# Patient Record
Sex: MALE | Race: WHITE | Employment: OTHER
[De-identification: names, ages, dates, MRNs, and addresses within clinical notes are randomized per-mention and may not be internally consistent; named-entity substitution may affect disease eponyms.]

---

## 2017-02-27 ENCOUNTER — OFFICE VISIT (OUTPATIENT)
Dept: INTERNAL MEDICINE | Facility: CLINIC | Age: 72
End: 2017-02-27

## 2017-02-27 VITALS
DIASTOLIC BLOOD PRESSURE: 74 MMHG | SYSTOLIC BLOOD PRESSURE: 136 MMHG | BODY MASS INDEX: 18.81 KG/M2 | WEIGHT: 127 LBS | HEIGHT: 69 IN

## 2017-02-27 DIAGNOSIS — J06.9 URI, ACUTE: Primary | ICD-10-CM

## 2017-02-27 PROCEDURE — 99212 OFFICE O/P EST SF 10 MIN: CPT | Performed by: INTERNAL MEDICINE

## 2017-02-27 RX ORDER — AZITHROMYCIN 250 MG/1
TABLET, FILM COATED ORAL
Qty: 1 PACKET | Refills: 0 | Status: SHIPPED | OUTPATIENT
Start: 2017-02-27 | End: 2017-07-31 | Stop reason: ALTCHOICE

## 2017-07-31 ENCOUNTER — OFFICE VISIT (OUTPATIENT)
Dept: INTERNAL MEDICINE CLINIC | Age: 72
End: 2017-07-31
Payer: MEDICARE

## 2017-07-31 VITALS
HEIGHT: 69 IN | HEART RATE: 77 BPM | WEIGHT: 120 LBS | OXYGEN SATURATION: 98 % | DIASTOLIC BLOOD PRESSURE: 72 MMHG | SYSTOLIC BLOOD PRESSURE: 128 MMHG | BODY MASS INDEX: 17.77 KG/M2 | TEMPERATURE: 98.1 F

## 2017-07-31 DIAGNOSIS — M67.431 GANGLION CYST OF WRIST, RIGHT: Primary | ICD-10-CM

## 2017-07-31 PROCEDURE — 99213 OFFICE O/P EST LOW 20 MIN: CPT | Performed by: NURSE PRACTITIONER

## 2017-07-31 ASSESSMENT — PATIENT HEALTH QUESTIONNAIRE - PHQ9
1. LITTLE INTEREST OR PLEASURE IN DOING THINGS: 0
SUM OF ALL RESPONSES TO PHQ QUESTIONS 1-9: 0
2. FEELING DOWN, DEPRESSED OR HOPELESS: 0
SUM OF ALL RESPONSES TO PHQ9 QUESTIONS 1 & 2: 0

## 2017-07-31 ASSESSMENT — ENCOUNTER SYMPTOMS: ROS SKIN COMMENTS: GROWTH

## 2017-09-11 ENCOUNTER — OFFICE VISIT (OUTPATIENT)
Dept: INTERNAL MEDICINE CLINIC | Age: 72
End: 2017-09-11
Payer: MEDICARE

## 2017-09-11 VITALS
HEIGHT: 69 IN | WEIGHT: 121 LBS | SYSTOLIC BLOOD PRESSURE: 110 MMHG | DIASTOLIC BLOOD PRESSURE: 70 MMHG | BODY MASS INDEX: 17.92 KG/M2

## 2017-09-11 DIAGNOSIS — M94.9 DISORDER OF CARTILAGE: ICD-10-CM

## 2017-09-11 DIAGNOSIS — M51.26 LUMBAR DISC HERNIATION: ICD-10-CM

## 2017-09-11 DIAGNOSIS — Z12.5 ENCOUNTER FOR SCREENING FOR MALIGNANT NEOPLASM OF PROSTATE: ICD-10-CM

## 2017-09-11 DIAGNOSIS — M25.531 RIGHT WRIST PAIN: ICD-10-CM

## 2017-09-11 DIAGNOSIS — E78.5 DYSLIPIDEMIA: Primary | ICD-10-CM

## 2017-09-11 DIAGNOSIS — R39.15 URINARY URGENCY: ICD-10-CM

## 2017-09-11 PROCEDURE — 99214 OFFICE O/P EST MOD 30 MIN: CPT | Performed by: INTERNAL MEDICINE

## 2017-09-11 ASSESSMENT — ENCOUNTER SYMPTOMS
APNEA: 0
TROUBLE SWALLOWING: 0
SHORTNESS OF BREATH: 0
CONSTIPATION: 0
CHOKING: 0
ANAL BLEEDING: 0
NAUSEA: 0
ABDOMINAL DISTENTION: 0
SINUS PRESSURE: 0
EYE ITCHING: 0
FACIAL SWELLING: 0
EYE DISCHARGE: 0
VOMITING: 0
BACK PAIN: 1
VOICE CHANGE: 0
COUGH: 0
RECTAL PAIN: 0
STRIDOR: 0
DIARRHEA: 0
SORE THROAT: 0
ABDOMINAL PAIN: 0
WHEEZING: 0
CHEST TIGHTNESS: 0
PHOTOPHOBIA: 0
RHINORRHEA: 0
BLOOD IN STOOL: 0

## 2017-11-02 ENCOUNTER — HOSPITAL ENCOUNTER (OUTPATIENT)
Age: 72
Setting detail: SPECIMEN
Discharge: HOME | End: 2017-11-02
Payer: MEDICARE

## 2017-11-02 DIAGNOSIS — Z12.5 ENCOUNTER FOR SCREENING FOR MALIGNANT NEOPLASM OF PROSTATE: ICD-10-CM

## 2017-11-02 DIAGNOSIS — M94.9 DISORDER OF CARTILAGE: ICD-10-CM

## 2017-11-02 DIAGNOSIS — R39.15 URINARY URGENCY: ICD-10-CM

## 2017-11-02 DIAGNOSIS — E78.5 DYSLIPIDEMIA: ICD-10-CM

## 2017-11-02 DIAGNOSIS — M51.26 LUMBAR DISC HERNIATION: ICD-10-CM

## 2017-11-02 LAB
ABSOLUTE EOS #: 0.12 K/UL (ref 0–0.44)
ABSOLUTE IMMATURE GRANULOCYTE: <0.03 K/UL (ref 0–0.3)
ABSOLUTE LYMPH #: 1.84 K/UL (ref 1.1–3.7)
ABSOLUTE MONO #: 0.55 K/UL (ref 0.1–1.2)
ALBUMIN SERPL-MCNC: 4.3 G/DL (ref 3.5–5.2)
ALBUMIN/GLOBULIN RATIO: 1.5 (ref 1–2.5)
ALP BLD-CCNC: 53 U/L (ref 40–129)
ALT SERPL-CCNC: 11 U/L (ref 5–41)
ANION GAP SERPL CALCULATED.3IONS-SCNC: 12 MMOL/L (ref 9–17)
AST SERPL-CCNC: 15 U/L
BASOPHILS # BLD: 1 % (ref 0–2)
BASOPHILS ABSOLUTE: 0.03 K/UL (ref 0–0.2)
BILIRUB SERPL-MCNC: 0.42 MG/DL (ref 0.3–1.2)
BILIRUBIN URINE: NEGATIVE
BUN BLDV-MCNC: 20 MG/DL (ref 8–23)
BUN/CREAT BLD: NORMAL (ref 9–20)
CALCIUM SERPL-MCNC: 9 MG/DL (ref 8.6–10.4)
CHLORIDE BLD-SCNC: 105 MMOL/L (ref 98–107)
CHOLESTEROL/HDL RATIO: 3.4
CHOLESTEROL: 196 MG/DL
CO2: 26 MMOL/L (ref 20–31)
COLOR: YELLOW
COMMENT UA: NORMAL
CREAT SERPL-MCNC: 0.9 MG/DL (ref 0.7–1.2)
DIFFERENTIAL TYPE: NORMAL
EOSINOPHILS RELATIVE PERCENT: 2 % (ref 1–4)
GFR AFRICAN AMERICAN: >60 ML/MIN
GFR NON-AFRICAN AMERICAN: >60 ML/MIN
GFR SERPL CREATININE-BSD FRML MDRD: NORMAL ML/MIN/{1.73_M2}
GFR SERPL CREATININE-BSD FRML MDRD: NORMAL ML/MIN/{1.73_M2}
GLUCOSE BLD-MCNC: 97 MG/DL (ref 70–99)
GLUCOSE URINE: NEGATIVE
HCT VFR BLD CALC: 43.1 % (ref 40.7–50.3)
HDLC SERPL-MCNC: 57 MG/DL
HEMOGLOBIN: 13.8 G/DL (ref 13–17)
IMMATURE GRANULOCYTES: 0 %
KETONES, URINE: NEGATIVE
LDL CHOLESTEROL: 121 MG/DL (ref 0–130)
LEUKOCYTE ESTERASE, URINE: NEGATIVE
LYMPHOCYTES # BLD: 30 % (ref 24–43)
MCH RBC QN AUTO: 29.7 PG (ref 25.2–33.5)
MCHC RBC AUTO-ENTMCNC: 32 G/DL (ref 29.9–34.7)
MCV RBC AUTO: 92.7 FL (ref 82.6–102.9)
MONOCYTES # BLD: 9 % (ref 3–12)
NITRITE, URINE: NEGATIVE
PDW BLD-RTO: 12.7 % (ref 11.8–14.4)
PH UA: 5 (ref 5–8)
PLATELET # BLD: 170 K/UL (ref 138–453)
PLATELET ESTIMATE: NORMAL
PMV BLD AUTO: 10 FL (ref 8.1–13.5)
POTASSIUM SERPL-SCNC: 4.4 MMOL/L (ref 3.7–5.3)
PROSTATE SPECIFIC ANTIGEN: 4.19 UG/L
PROTEIN UA: NEGATIVE
RBC # BLD: 4.65 M/UL (ref 4.21–5.77)
RBC # BLD: NORMAL 10*6/UL
SEG NEUTROPHILS: 58 % (ref 36–65)
SEGMENTED NEUTROPHILS ABSOLUTE COUNT: 3.56 K/UL (ref 1.5–8.1)
SODIUM BLD-SCNC: 143 MMOL/L (ref 135–144)
SPECIFIC GRAVITY UA: 1.02 (ref 1–1.03)
TOTAL PROTEIN: 7.2 G/DL (ref 6.4–8.3)
TRIGL SERPL-MCNC: 90 MG/DL
TSH SERPL DL<=0.05 MIU/L-ACNC: 2.6 MIU/L (ref 0.3–5)
TURBIDITY: CLEAR
URINE HGB: NEGATIVE
UROBILINOGEN, URINE: NORMAL
VITAMIN D 25-HYDROXY: 30 NG/ML (ref 30–100)
VLDLC SERPL CALC-MCNC: NORMAL MG/DL (ref 1–30)
WBC # BLD: 6.1 K/UL (ref 3.5–11.3)
WBC # BLD: NORMAL 10*3/UL

## 2020-01-21 ENCOUNTER — OFFICE VISIT (OUTPATIENT)
Dept: INTERNAL MEDICINE CLINIC | Age: 75
End: 2020-01-21
Payer: MEDICARE

## 2020-01-21 VITALS
HEIGHT: 69 IN | OXYGEN SATURATION: 98 % | SYSTOLIC BLOOD PRESSURE: 120 MMHG | BODY MASS INDEX: 18.96 KG/M2 | HEART RATE: 79 BPM | WEIGHT: 128 LBS | DIASTOLIC BLOOD PRESSURE: 76 MMHG

## 2020-01-21 PROBLEM — N40.1 BENIGN PROSTATIC HYPERPLASIA WITH URINARY FREQUENCY: Status: ACTIVE | Noted: 2020-01-21

## 2020-01-21 PROBLEM — R35.0 BENIGN PROSTATIC HYPERPLASIA WITH URINARY FREQUENCY: Status: ACTIVE | Noted: 2020-01-21

## 2020-01-21 PROCEDURE — G0008 ADMIN INFLUENZA VIRUS VAC: HCPCS | Performed by: INTERNAL MEDICINE

## 2020-01-21 PROCEDURE — 90653 IIV ADJUVANT VACCINE IM: CPT | Performed by: INTERNAL MEDICINE

## 2020-01-21 PROCEDURE — G0439 PPPS, SUBSEQ VISIT: HCPCS | Performed by: INTERNAL MEDICINE

## 2020-01-21 ASSESSMENT — ENCOUNTER SYMPTOMS
EYES NEGATIVE: 1
RESPIRATORY NEGATIVE: 1
BACK PAIN: 1
GASTROINTESTINAL NEGATIVE: 1

## 2020-01-21 ASSESSMENT — PATIENT HEALTH QUESTIONNAIRE - PHQ9
1. LITTLE INTEREST OR PLEASURE IN DOING THINGS: 0
SUM OF ALL RESPONSES TO PHQ QUESTIONS 1-9: 0
SUM OF ALL RESPONSES TO PHQ9 QUESTIONS 1 & 2: 0
2. FEELING DOWN, DEPRESSED OR HOPELESS: 0
SUM OF ALL RESPONSES TO PHQ QUESTIONS 1-9: 0

## 2020-01-21 NOTE — PROGRESS NOTES
Subjective:      Chief Complaint   Patient presents with   Shanelle Brunner Doctor     Pt here today as a new patient to Dr. Natty Delarosa        Patient ID: Rosi Brady is a 69 y.o. male. Visit Information    Have you changed or started any medications since your last visit including any over-the-counter medicines, vitamins, or herbal medicines? no   Are you having any side effects from any of your medications? -  no  Have you stopped taking any of your medications? Is so, why? -  no    Have you seen any other physician or provider since your last visit? No  Have you had any other diagnostic tests since your last visit? No  Have you been seen in the emergency room and/or had an admission to a hospital since we last saw you? No  Have you had your routine dental cleaning in the past 6 months? no    Have you activated your [employer] account? If not, what are your barriers? No:      Patient Care Team:  Leigh Oliveira MD as PCP - General    Medical History Review  Past Medical, Family, and Social History reviewed and does not contribute to the patient presenting condition    Health Maintenance   Topic Date Due    Hepatitis C screen  1945    DTaP/Tdap/Td vaccine (1 - Tdap) 09/24/1958    Shingles Vaccine (1 of 2) 09/24/1997    Pneumococcal 65+ years Vaccine (2 of 2 - PPSV23) 02/16/2017    Annual Wellness Visit (AWV)  06/23/2019    Flu vaccine (1) 09/01/2019    Colon cancer screen colonoscopy  10/24/2022    Lipid screen  11/02/2022       He has no symptoms . He has Hx of lumbar and cervical disc surgery. He had cervical myelopathy      Review of Systems   Constitutional: Negative. HENT: Negative. Eyes: Negative. Respiratory: Negative. Cardiovascular: Negative. Gastrointestinal: Negative. Endocrine: Negative. Genitourinary: Positive for frequency and urgency. Musculoskeletal: Positive for back pain and myalgias. Hx cervical and lumbar laminectomy   Skin: Negative. cranial nerve deficit. Sensory: No sensory deficit. Motor: No weakness. Coordination: Coordination normal.      Gait: Gait normal.      Deep Tendon Reflexes: Reflexes abnormal.   Psychiatric:         Mood and Affect: Mood normal.         Behavior: Behavior normal.         Thought Content: Thought content normal.         Judgment: Judgment normal.         Assessment / Plan:   Joycelyn Rodriguez was seen today for established new doctor. Diagnoses and all orders for this visit:    Need for influenza vaccination    Cervical spondylosis with myelopathy= no weakness but has limited ROM    Lumbar disc herniation= occasional back ache     Dyslipidemia= not treated ,he has minimal risks   Quadriplegia, C1-C4 incomplete (HCC)= recovered     Benign prostatic hyperplasia with urinary frequency= check PSA       No follow-ups on file. Orders Placed This Encounter   Procedures    INFLUENZA, TRIV, INACTIVATED, SUBUNIT, ADJUVANTED, 65 YRS AND OLDER, IM, PREFILL SYR, 0.5ML (FLUAD TRIV)    CBC Auto Differential     Standing Status:   Future     Standing Expiration Date:   1/20/2021    Comprehensive Metabolic Panel     Standing Status:   Future     Standing Expiration Date:   1/20/2021    Urinalysis with Microscopic     Standing Status:   Future     Standing Expiration Date:   1/20/2021     Order Specific Question:   SPECIFY(EX-CATH,MIDSTREAM,CYSTO,ETC)?      Answer:   clean catch    Sedimentation rate, automated     Standing Status:   Future     Standing Expiration Date:   1/20/2021    TSH without Reflex     Standing Status:   Future     Standing Expiration Date:   1/20/2021    Lipid Panel     Order Specific Question:   Is Patient Fasting?/# of Hours     Answer:   fasting    Psa screening     Standing Status:   Future     Standing Expiration Date:   1/20/2021    Lactate Dehydrogenase     Standing Status:   Future     Standing Expiration Date:   1/20/2021    Electrophoresis Protein, Serum without Reflex to Immunofixation     Standing Status:   Future     Standing Expiration Date:   1/20/2021    C-Reactive Protein     Standing Status:   Future     Standing Expiration Date:   1/20/2021    POCT Fecal Immunochemical Test (FIT)     Standing Status:   Future     Standing Expiration Date:   1/23/2020     No orders of the defined types were placed in this encounter.

## 2020-01-23 ENCOUNTER — HOSPITAL ENCOUNTER (OUTPATIENT)
Age: 75
Setting detail: SPECIMEN
Discharge: HOME | End: 2020-01-23
Payer: MEDICARE

## 2020-01-23 LAB
-: NORMAL
ABSOLUTE EOS #: 0.17 K/UL (ref 0–0.44)
ABSOLUTE IMMATURE GRANULOCYTE: <0.03 K/UL (ref 0–0.3)
ABSOLUTE LYMPH #: 1.58 K/UL (ref 1.1–3.7)
ABSOLUTE MONO #: 0.85 K/UL (ref 0.1–1.2)
ALBUMIN SERPL-MCNC: 4.2 G/DL (ref 3.5–5.2)
ALBUMIN/GLOBULIN RATIO: 1.4 (ref 1–2.5)
ALP BLD-CCNC: 56 U/L (ref 40–129)
ALT SERPL-CCNC: 12 U/L (ref 5–41)
AMORPHOUS: NORMAL
ANION GAP SERPL CALCULATED.3IONS-SCNC: 13 MMOL/L (ref 9–17)
AST SERPL-CCNC: 16 U/L
BACTERIA: NORMAL
BASOPHILS # BLD: 1 % (ref 0–2)
BASOPHILS ABSOLUTE: 0.04 K/UL (ref 0–0.2)
BILIRUB SERPL-MCNC: 0.47 MG/DL (ref 0.3–1.2)
BILIRUBIN URINE: NEGATIVE
BUN BLDV-MCNC: 25 MG/DL (ref 8–23)
BUN/CREAT BLD: ABNORMAL (ref 9–20)
C-REACTIVE PROTEIN: 2.6 MG/L (ref 0–5)
CALCIUM SERPL-MCNC: 9.6 MG/DL (ref 8.6–10.4)
CASTS UA: NORMAL /LPF (ref 0–8)
CHLORIDE BLD-SCNC: 102 MMOL/L (ref 98–107)
CHOLESTEROL, FASTING: 201 MG/DL
CHOLESTEROL/HDL RATIO: 4
CO2: 26 MMOL/L (ref 20–31)
COLOR: YELLOW
CREAT SERPL-MCNC: 0.94 MG/DL (ref 0.7–1.2)
CRYSTALS, UA: NORMAL /HPF
DIFFERENTIAL TYPE: ABNORMAL
EOSINOPHILS RELATIVE PERCENT: 3 % (ref 1–4)
EPITHELIAL CELLS UA: NORMAL /HPF (ref 0–5)
GFR AFRICAN AMERICAN: >60 ML/MIN
GFR NON-AFRICAN AMERICAN: >60 ML/MIN
GFR SERPL CREATININE-BSD FRML MDRD: ABNORMAL ML/MIN/{1.73_M2}
GFR SERPL CREATININE-BSD FRML MDRD: ABNORMAL ML/MIN/{1.73_M2}
GLUCOSE BLD-MCNC: 93 MG/DL (ref 70–99)
GLUCOSE URINE: NEGATIVE
HCT VFR BLD CALC: 44.9 % (ref 40.7–50.3)
HDLC SERPL-MCNC: 50 MG/DL
HEMOGLOBIN: 14.1 G/DL (ref 13–17)
IMMATURE GRANULOCYTES: 0 %
KETONES, URINE: NEGATIVE
LACTATE DEHYDROGENASE: 177 U/L (ref 135–225)
LDL CHOLESTEROL: 133 MG/DL (ref 0–130)
LEUKOCYTE ESTERASE, URINE: NEGATIVE
LYMPHOCYTES # BLD: 27 % (ref 24–43)
MCH RBC QN AUTO: 29.3 PG (ref 25.2–33.5)
MCHC RBC AUTO-ENTMCNC: 31.4 G/DL (ref 28.4–34.8)
MCV RBC AUTO: 93.3 FL (ref 82.6–102.9)
MONOCYTES # BLD: 15 % (ref 3–12)
MUCUS: NORMAL
NITRITE, URINE: NEGATIVE
NRBC AUTOMATED: 0 PER 100 WBC
OTHER OBSERVATIONS UA: NORMAL
PDW BLD-RTO: 12.7 % (ref 11.8–14.4)
PH UA: 5 (ref 5–8)
PLATELET # BLD: 170 K/UL (ref 138–453)
PLATELET ESTIMATE: ABNORMAL
PMV BLD AUTO: 10.1 FL (ref 8.1–13.5)
POTASSIUM SERPL-SCNC: 4.5 MMOL/L (ref 3.7–5.3)
PROSTATE SPECIFIC ANTIGEN: 2.02 UG/L
PROTEIN UA: NEGATIVE
RBC # BLD: 4.81 M/UL (ref 4.21–5.77)
RBC # BLD: ABNORMAL 10*6/UL
RBC UA: NORMAL /HPF (ref 0–4)
RENAL EPITHELIAL, UA: NORMAL /HPF
SEDIMENTATION RATE, ERYTHROCYTE: 12 MM (ref 0–10)
SEG NEUTROPHILS: 54 % (ref 36–65)
SEGMENTED NEUTROPHILS ABSOLUTE COUNT: 3.17 K/UL (ref 1.5–8.1)
SODIUM BLD-SCNC: 141 MMOL/L (ref 135–144)
SPECIFIC GRAVITY UA: 1.02 (ref 1–1.03)
TOTAL PROTEIN: 7.1 G/DL (ref 6.4–8.3)
TRICHOMONAS: NORMAL
TRIGLYCERIDE, FASTING: 91 MG/DL
TSH SERPL DL<=0.05 MIU/L-ACNC: 1.98 MIU/L (ref 0.3–5)
TURBIDITY: CLEAR
URINE HGB: NEGATIVE
UROBILINOGEN, URINE: NORMAL
VLDLC SERPL CALC-MCNC: ABNORMAL MG/DL (ref 1–30)
WBC # BLD: 5.8 K/UL (ref 3.5–11.3)
WBC # BLD: ABNORMAL 10*3/UL
WBC UA: NORMAL /HPF (ref 0–5)
YEAST: NORMAL

## 2020-01-27 LAB
ALBUMIN (CALCULATED): 4.7 G/DL (ref 3.2–5.2)
ALBUMIN PERCENT: 67 % (ref 45–65)
ALPHA 1 PERCENT: 2 % (ref 3–6)
ALPHA 2 PERCENT: 8 % (ref 6–13)
ALPHA-1-GLOBULIN: 0.2 G/DL (ref 0.1–0.4)
ALPHA-2-GLOBULIN: 0.6 G/DL (ref 0.5–0.9)
BETA GLOBULIN: 0.8 G/DL (ref 0.5–1.1)
BETA PERCENT: 11 % (ref 11–19)
GAMMA GLOBULIN %: 12 % (ref 9–20)
GAMMA GLOBULIN: 0.8 G/DL (ref 0.5–1.5)
PATHOLOGIST: ABNORMAL
PROTEIN ELECTROPHORESIS, SERUM: ABNORMAL
TOTAL PROT. SUM,%: 100 % (ref 98–102)
TOTAL PROT. SUM: 7.1 G/DL (ref 6.3–8.2)
TOTAL PROTEIN: 7.1 G/DL (ref 6.4–8.3)

## 2021-03-10 ENCOUNTER — OFFICE VISIT (OUTPATIENT)
Dept: INTERNAL MEDICINE CLINIC | Age: 76
End: 2021-03-10
Payer: MEDICARE

## 2021-03-10 VITALS
BODY MASS INDEX: 18.81 KG/M2 | DIASTOLIC BLOOD PRESSURE: 74 MMHG | HEART RATE: 74 BPM | TEMPERATURE: 99.2 F | WEIGHT: 127 LBS | SYSTOLIC BLOOD PRESSURE: 122 MMHG | HEIGHT: 69 IN | OXYGEN SATURATION: 97 %

## 2021-03-10 DIAGNOSIS — Z23 ENCOUNTER FOR IMMUNIZATION: ICD-10-CM

## 2021-03-10 DIAGNOSIS — Z12.5 ENCOUNTER FOR SCREENING FOR MALIGNANT NEOPLASM OF PROSTATE: ICD-10-CM

## 2021-03-10 DIAGNOSIS — G82.52 QUADRIPLEGIA, C1-C4 INCOMPLETE (HCC): Primary | ICD-10-CM

## 2021-03-10 DIAGNOSIS — M47.12 CERVICAL SPONDYLOSIS WITH MYELOPATHY: Chronic | ICD-10-CM

## 2021-03-10 DIAGNOSIS — Z00.00 INITIAL MEDICARE ANNUAL WELLNESS VISIT: ICD-10-CM

## 2021-03-10 DIAGNOSIS — M51.26 LUMBAR DISC HERNIATION: ICD-10-CM

## 2021-03-10 PROCEDURE — 4040F PNEUMOC VAC/ADMIN/RCVD: CPT | Performed by: INTERNAL MEDICINE

## 2021-03-10 PROCEDURE — G0402 INITIAL PREVENTIVE EXAM: HCPCS | Performed by: INTERNAL MEDICINE

## 2021-03-10 PROCEDURE — 1123F ACP DISCUSS/DSCN MKR DOCD: CPT | Performed by: INTERNAL MEDICINE

## 2021-03-10 PROCEDURE — 3017F COLORECTAL CA SCREEN DOC REV: CPT | Performed by: INTERNAL MEDICINE

## 2021-03-10 ASSESSMENT — ENCOUNTER SYMPTOMS
RESPIRATORY NEGATIVE: 1
EYES NEGATIVE: 1
GASTROINTESTINAL NEGATIVE: 1
BACK PAIN: 0

## 2021-03-10 ASSESSMENT — PATIENT HEALTH QUESTIONNAIRE - PHQ9
1. LITTLE INTEREST OR PLEASURE IN DOING THINGS: 0
SUM OF ALL RESPONSES TO PHQ9 QUESTIONS 1 & 2: 0
SUM OF ALL RESPONSES TO PHQ QUESTIONS 1-9: 0

## 2021-03-10 NOTE — PROGRESS NOTES
Subjective:      Patient ID: Mirta Park is a 70 y.o. male. He is here for Medicare wellness visit and is asymptomatic      Review of Systems   Constitutional: Negative. HENT: Negative. Eyes: Negative. Respiratory: Negative. Cardiovascular: Negative. Gastrointestinal: Negative. Endocrine: Negative. Genitourinary: Negative for frequency and urgency. Musculoskeletal: Negative for back pain and myalgias. Hx cervical and lumbar laminectomy   Skin: Negative. Neurological: Negative. Hematological: Negative. Psychiatric/Behavioral: Negative. Objective:   Physical Exam  Constitutional:       Appearance: Normal appearance. Comments: Looks younger than stated age    HENT:      Head: Normocephalic and atraumatic. Nose: Nose normal. No congestion or rhinorrhea. Mouth/Throat:      Mouth: Mucous membranes are moist.      Pharynx: No oropharyngeal exudate or posterior oropharyngeal erythema. Eyes:      General:         Right eye: No discharge. Left eye: No discharge. Extraocular Movements: Extraocular movements intact. Pupils: Pupils are equal, round, and reactive to light. Neck:      Musculoskeletal: Neck rigidity present. No muscular tenderness. Vascular: No carotid bruit. Cardiovascular:      Rate and Rhythm: Normal rate and regular rhythm. Pulses: Normal pulses. Heart sounds: No murmur. Pulmonary:      Effort: No respiratory distress. Breath sounds: No stridor. No wheezing, rhonchi or rales. Chest:      Chest wall: No tenderness. Abdominal:      General: Abdomen is flat. Palpations: Abdomen is soft. There is no mass. Tenderness: There is no abdominal tenderness. Hernia: No hernia is present. There is no hernia in the left inguinal area or right inguinal area. Genitourinary:     Penis: Normal and circumcised.  No phimosis, paraphimosis, hypospadias, erythema, tenderness, discharge, swelling or lesions. Testes: Normal.      Epididymis:      Right: Normal.      Left: Normal.      Rectum: Normal.      Comments: Repeat her digital rectal exam shows that he has large prostate but normal in consistency. The size is 3+  Musculoskeletal:         General: No swelling, tenderness, deformity or signs of injury. Right lower leg: No edema. Left lower leg: No edema. Lymphadenopathy:      Cervical: No cervical adenopathy. Lower Body: No right inguinal adenopathy. No left inguinal adenopathy. Skin:     General: Skin is warm and dry. Capillary Refill: Capillary refill takes less than 2 seconds. Coloration: Skin is not jaundiced or pale. Findings: No bruising, erythema, lesion or rash. Neurological:      General: No focal deficit present. Mental Status: He is alert and oriented to person, place, and time. Cranial Nerves: No cranial nerve deficit. Sensory: No sensory deficit. Motor: No weakness. Coordination: Coordination normal.      Gait: Gait normal.      Deep Tendon Reflexes: Reflexes abnormal.   Psychiatric:         Mood and Affect: Mood normal.         Behavior: Behavior normal.         Thought Content: Thought content normal.         Judgment: Judgment normal.         Assessment / Plan:      Diagnosis Orders   1. Quadriplegia, C1-C4 incomplete ([facility] [address]     2. Cervical spondylosis with myelopathy     3. Lumbar disc herniation     4. Initial Medicare annual wellness visit  CBC Auto Differential    Comprehensive Metabolic Panel    Lipid Panel    Urinalysis with Microscopic    Sedimentation rate, automated    TSH without Reflex    C-Reactive Protein    PSA screening    Lactate Dehydrogenase    Hepatitis C Antibody    PREVNAR 13 IM (Pneumococcal conjugate vaccine 13-valent)    Tetanus-Diphth-Acell Pertussis (BOOSTRIX) 5-2.5-18.5 LF-MCG/0.5 injection    Lead, Blood    Cologuard (For External Results Only)   5.  Encounter for screening for malignant neoplasm of prostate   PSA screening   6. Encounter for immunization   PREVNAR 13 IM (Pneumococcal conjugate vaccine 13-valent)      Diagnosis Orders   1. Quadriplegia, C1-C4 incomplete ([address]     2. Cervical spondylosis with myelopathy     3. Lumbar disc herniation     4. Initial Medicare annual wellness visit =    His wellness visit he was given Prevnar, tetanus booster and he was advised that he can go to the pharmacy to get herpes zoster vaccine. I will check Cologuard and also complete laboratory work-up including hep C screening. Please normal and he has no gait problems         Return in about 6 months (around 9/10/2021) for Follow Up. Orders Placed This Encounter   Procedures    Cologuard (For External Results Only)     This test is performed by an external laboratory and is used for result attachment only. It is required that this order requisition be faxed to: [facility] @ [phone]. See [email or web address] for further information. Standing Status:   Future     Standing Expiration Date:   3/10/2022    PREVNAR 13 IM (Pneumococcal conjugate vaccine 13-valent)    CBC Auto Differential     Standing Status:   Future     Standing Expiration Date:   3/10/2022    Comprehensive Metabolic Panel     Standing Status:   Future     Standing Expiration Date:   3/10/2022    Lipid Panel     Order Specific Question:   Is Patient Fasting?/# of Hours     Answer:   fasting    Urinalysis with Microscopic     Standing Status:   Future     Standing Expiration Date:   3/10/2022     Order Specific Question:   SPECIFY(EX-CATH,MIDSTREAM,CYSTO,ETC)?      Answer:   clean catch    Sedimentation rate, automated     Standing Status:   Future     Standing Expiration Date:   3/10/2022    TSH without Reflex     Standing Status:   Future     Standing Expiration Date:   3/10/2022    C-Reactive Protein     Standing Status:   Future     Standing Expiration Date:   3/10/2022    PSA screening     Standing Status:   Future Standing Expiration Date:   3/10/2022    Lactate Dehydrogenase     Standing Status:   Future     Standing Expiration Date:   3/10/2022    Hepatitis C Antibody     Standing Status:   Future     Standing Expiration Date:   3/10/2022    Lead, Blood     Standing Status:   Future     Standing Expiration Date:   3/10/2022     Orders Placed This Encounter   Medications    Tetanus-Diphth-Acell Pertussis ([address]) 5-2.5-18.5 LF-MCG/0.5 injection     Sig: Inject 0.5 mLs into the muscle once for 1 dose     Dispense:  1 each     Refill:  0        Visit Information    Have you changed or started any medications since your last visit including any over-the-counter medicines, vitamins, or herbal medicines? no   Are you having any side effects from any of your medications? -  no  Have you stopped taking any of your medications? Is so, why? -  no    Have you seen any other physician or provider since your last visit? No  Have you had any other diagnostic tests since your last visit? No  Have you been seen in the emergency room and/or had an admission to a hospital since we last saw you? No  Have you had your routine dental cleaning in the past 6 months? no    Have you activated your [employer] account? If not, what are your barriers?  No:      Patient Care Team:  Savanna Barnard MD as PCP - General (Internal Medicine)  Savanna Barnard MD as PCP - [facility]    Medical History Review  Past Medical, Family, and Social History reviewed and does not contribute to the patient presenting condition    Health Maintenance   Topic Date Due    Hepatitis C screen  Never done    COVID-19 Vaccine (1 of 2) Never done    DTaP/Tdap/Td vaccine (1 - Tdap) Never done    Shingles Vaccine (1 of 2) Never done    Pneumococcal 65+ years Vaccine (2 of 2 - PPSV23) 02/16/2017    Annual Wellness Visit (AWV)  Never done    Flu vaccine (1) 09/01/2020    Colon cancer screen colonoscopy  10/24/2022    Lipid screen  01/23/2025    Hepatitis A vaccine  Aged Out    Hepatitis B vaccine  Aged Out    Hib vaccine  Aged Out    Meningococcal (ACWY) vaccine  Aged Out

## 2021-03-12 DIAGNOSIS — Z12.11 SCREENING FOR MALIGNANT NEOPLASM OF COLON: Primary | ICD-10-CM

## 2021-03-15 ENCOUNTER — NURSE ONLY (OUTPATIENT)
Dept: INTERNAL MEDICINE CLINIC | Age: 76
End: 2021-03-15
Payer: MEDICARE

## 2021-03-15 ENCOUNTER — HOSPITAL ENCOUNTER (OUTPATIENT)
Age: 76
Setting detail: SPECIMEN
Discharge: HOME | End: 2021-03-15
Payer: MEDICARE

## 2021-03-15 DIAGNOSIS — Z00.00 INITIAL MEDICARE ANNUAL WELLNESS VISIT: ICD-10-CM

## 2021-03-15 DIAGNOSIS — Z12.5 ENCOUNTER FOR SCREENING FOR MALIGNANT NEOPLASM OF PROSTATE: ICD-10-CM

## 2021-03-15 DIAGNOSIS — Z23 NEED FOR PROPHYLACTIC VACCINATION AGAINST STREPTOCOCCUS PNEUMONIAE (PNEUMOCOCCUS): Primary | ICD-10-CM

## 2021-03-15 DIAGNOSIS — Z23 NEED FOR TETANUS BOOSTER: ICD-10-CM

## 2021-03-15 LAB
-: NORMAL
ABSOLUTE EOS #: 0.12 K/UL (ref 0–0.44)
ABSOLUTE IMMATURE GRANULOCYTE: <0.03 K/UL (ref 0–0.3)
ABSOLUTE LYMPH #: 1.52 K/UL (ref 1.1–3.7)
ABSOLUTE MONO #: 0.64 K/UL (ref 0.1–1.2)
ALBUMIN SERPL-MCNC: 4.7 G/DL (ref 3.5–5.2)
ALBUMIN/GLOBULIN RATIO: 1.6 (ref 1–2.5)
ALP BLD-CCNC: 59 U/L (ref 40–129)
ALT SERPL-CCNC: 13 U/L (ref 5–41)
AMORPHOUS: NORMAL
ANION GAP SERPL CALCULATED.3IONS-SCNC: 14 MMOL/L (ref 9–17)
AST SERPL-CCNC: 18 U/L
BACTERIA: NORMAL
BASOPHILS # BLD: 1 % (ref 0–2)
BASOPHILS ABSOLUTE: 0.05 K/UL (ref 0–0.2)
BILIRUB SERPL-MCNC: 0.55 MG/DL (ref 0.3–1.2)
BILIRUBIN URINE: NEGATIVE
BUN BLDV-MCNC: 22 MG/DL (ref 8–23)
BUN/CREAT BLD: NORMAL (ref 9–20)
C-REACTIVE PROTEIN: <3 MG/L (ref 0–5)
CALCIUM SERPL-MCNC: 9.7 MG/DL (ref 8.6–10.4)
CASTS UA: NORMAL /LPF (ref 0–8)
CHLORIDE BLD-SCNC: 103 MMOL/L (ref 98–107)
CHOLESTEROL, FASTING: 218 MG/DL
CHOLESTEROL/HDL RATIO: 3.8
CO2: 25 MMOL/L (ref 20–31)
COLOR: YELLOW
CREAT SERPL-MCNC: 0.97 MG/DL (ref 0.7–1.2)
CRYSTALS, UA: NORMAL /HPF
DIFFERENTIAL TYPE: NORMAL
EOSINOPHILS RELATIVE PERCENT: 2 % (ref 1–4)
EPITHELIAL CELLS UA: NORMAL /HPF (ref 0–5)
GFR AFRICAN AMERICAN: >60 ML/MIN
GFR NON-AFRICAN AMERICAN: >60 ML/MIN
GFR SERPL CREATININE-BSD FRML MDRD: NORMAL ML/MIN/{1.73_M2}
GFR SERPL CREATININE-BSD FRML MDRD: NORMAL ML/MIN/{1.73_M2}
GLUCOSE BLD-MCNC: 95 MG/DL (ref 70–99)
GLUCOSE URINE: NEGATIVE
HCT VFR BLD CALC: 43.6 % (ref 40.7–50.3)
HDLC SERPL-MCNC: 57 MG/DL
HEMOGLOBIN: 14 G/DL (ref 13–17)
HEPATITIS C ANTIBODY: NONREACTIVE
IMMATURE GRANULOCYTES: 0 %
KETONES, URINE: NEGATIVE
LACTATE DEHYDROGENASE: 173 U/L (ref 135–225)
LDL CHOLESTEROL: 146 MG/DL (ref 0–130)
LEUKOCYTE ESTERASE, URINE: NEGATIVE
LYMPHOCYTES # BLD: 25 % (ref 24–43)
MCH RBC QN AUTO: 29.8 PG (ref 25.2–33.5)
MCHC RBC AUTO-ENTMCNC: 32.1 G/DL (ref 28.4–34.8)
MCV RBC AUTO: 92.8 FL (ref 82.6–102.9)
MONOCYTES # BLD: 11 % (ref 3–12)
MUCUS: NORMAL
NITRITE, URINE: NEGATIVE
NRBC AUTOMATED: 0 PER 100 WBC
OTHER OBSERVATIONS UA: NORMAL
PDW BLD-RTO: 12.9 % (ref 11.8–14.4)
PH UA: 5 (ref 5–8)
PLATELET # BLD: 179 K/UL (ref 138–453)
PLATELET ESTIMATE: NORMAL
PMV BLD AUTO: 9.7 FL (ref 8.1–13.5)
POTASSIUM SERPL-SCNC: 4.3 MMOL/L (ref 3.7–5.3)
PROSTATE SPECIFIC ANTIGEN: 2.16 UG/L
PROTEIN UA: NEGATIVE
RBC # BLD: 4.7 M/UL (ref 4.21–5.77)
RBC # BLD: NORMAL 10*6/UL
RBC UA: NORMAL /HPF (ref 0–4)
RENAL EPITHELIAL, UA: NORMAL /HPF
SEDIMENTATION RATE, ERYTHROCYTE: 7 MM (ref 0–20)
SEG NEUTROPHILS: 61 % (ref 36–65)
SEGMENTED NEUTROPHILS ABSOLUTE COUNT: 3.76 K/UL (ref 1.5–8.1)
SODIUM BLD-SCNC: 142 MMOL/L (ref 135–144)
SPECIFIC GRAVITY UA: 1.02 (ref 1–1.03)
TOTAL PROTEIN: 7.6 G/DL (ref 6.4–8.3)
TRICHOMONAS: NORMAL
TRIGLYCERIDE, FASTING: 73 MG/DL
TSH SERPL DL<=0.05 MIU/L-ACNC: 2.09 MIU/L (ref 0.3–5)
TURBIDITY: CLEAR
URINE HGB: NEGATIVE
UROBILINOGEN, URINE: NORMAL
VLDLC SERPL CALC-MCNC: ABNORMAL MG/DL (ref 1–30)
WBC # BLD: 6.1 K/UL (ref 3.5–11.3)
WBC # BLD: NORMAL 10*3/UL
WBC UA: NORMAL /HPF (ref 0–5)
YEAST: NORMAL

## 2021-03-15 PROCEDURE — 90732 PPSV23 VACC 2 YRS+ SUBQ/IM: CPT | Performed by: INTERNAL MEDICINE

## 2021-03-15 PROCEDURE — G0009 ADMIN PNEUMOCOCCAL VACCINE: HCPCS | Performed by: INTERNAL MEDICINE

## 2021-03-16 LAB — LEAD BLOOD: 4 UG/DL (ref 0–4)

## 2021-03-30 ENCOUNTER — TELEPHONE (OUTPATIENT)
Dept: INTERNAL MEDICINE CLINIC | Age: 76
End: 2021-03-30

## 2021-03-31 DIAGNOSIS — Z12.11 SCREENING FOR MALIGNANT NEOPLASM OF COLON: ICD-10-CM

## 2021-04-09 PROBLEM — Z00.00 INITIAL MEDICARE ANNUAL WELLNESS VISIT: Status: RESOLVED | Noted: 2021-03-10 | Resolved: 2021-04-09

## 2021-12-08 ENCOUNTER — OFFICE VISIT (OUTPATIENT)
Dept: INTERNAL MEDICINE CLINIC | Age: 76
End: 2021-12-08
Payer: MEDICARE

## 2021-12-08 VITALS
OXYGEN SATURATION: 99 % | BODY MASS INDEX: 18.36 KG/M2 | HEART RATE: 76 BPM | SYSTOLIC BLOOD PRESSURE: 136 MMHG | WEIGHT: 124 LBS | DIASTOLIC BLOOD PRESSURE: 78 MMHG

## 2021-12-08 DIAGNOSIS — N40.1 BENIGN PROSTATIC HYPERPLASIA WITH URINARY FREQUENCY: ICD-10-CM

## 2021-12-08 DIAGNOSIS — M47.12 CERVICAL SPONDYLOSIS WITH MYELOPATHY: Primary | Chronic | ICD-10-CM

## 2021-12-08 DIAGNOSIS — M51.26 LUMBAR DISC HERNIATION: ICD-10-CM

## 2021-12-08 DIAGNOSIS — R35.0 BENIGN PROSTATIC HYPERPLASIA WITH URINARY FREQUENCY: ICD-10-CM

## 2021-12-08 DIAGNOSIS — G82.52 QUADRIPLEGIA, C1-C4 INCOMPLETE (HCC): Chronic | ICD-10-CM

## 2021-12-08 PROCEDURE — 99214 OFFICE O/P EST MOD 30 MIN: CPT | Performed by: INTERNAL MEDICINE

## 2021-12-08 RX ORDER — IBUPROFEN 400 MG/1
400 TABLET ORAL EVERY 6 HOURS PRN
COMMUNITY

## 2021-12-08 SDOH — ECONOMIC STABILITY: FOOD INSECURITY: WITHIN THE PAST 12 MONTHS, YOU WORRIED THAT YOUR FOOD WOULD RUN OUT BEFORE YOU GOT MONEY TO BUY MORE.: NEVER TRUE

## 2021-12-08 SDOH — ECONOMIC STABILITY: TRANSPORTATION INSECURITY
IN THE PAST 12 MONTHS, HAS THE LACK OF TRANSPORTATION KEPT YOU FROM MEDICAL APPOINTMENTS OR FROM GETTING MEDICATIONS?: NO

## 2021-12-08 SDOH — ECONOMIC STABILITY: FOOD INSECURITY: WITHIN THE PAST 12 MONTHS, THE FOOD YOU BOUGHT JUST DIDN'T LAST AND YOU DIDN'T HAVE MONEY TO GET MORE.: NEVER TRUE

## 2021-12-08 SDOH — ECONOMIC STABILITY: TRANSPORTATION INSECURITY
IN THE PAST 12 MONTHS, HAS LACK OF TRANSPORTATION KEPT YOU FROM MEETINGS, WORK, OR FROM GETTING THINGS NEEDED FOR DAILY LIVING?: NO

## 2021-12-08 ASSESSMENT — LIFESTYLE VARIABLES: HOW OFTEN DO YOU HAVE A DRINK CONTAINING ALCOHOL: NEVER

## 2021-12-08 ASSESSMENT — SOCIAL DETERMINANTS OF HEALTH (SDOH): HOW HARD IS IT FOR YOU TO PAY FOR THE VERY BASICS LIKE FOOD, HOUSING, MEDICAL CARE, AND HEATING?: NOT HARD AT ALL

## 2021-12-08 NOTE — PROGRESS NOTES
Subjective:      Patient ID: Valerie Timmons is a 78 y.o. male. The patient has a previous history of cervical disc disease and has had hardware in his neck. He stated that he has been raking and has developed significant neck pain. He denied any motor weakness in his upper or lower extremity and has no cranial nerve symptoms      Review of Systems   Constitutional: Negative. HENT: Negative. Eyes: Negative. Respiratory: Negative. Cardiovascular: Negative. Gastrointestinal: Negative. Endocrine: Negative. Genitourinary: Negative for frequency and urgency. Musculoskeletal: Negative for back pain and myalgias. Hx cervical and lumbar laminectomy   Skin: Negative. Neurological: Negative. Hematological: Negative. Psychiatric/Behavioral: Negative. Objective:   Physical Exam  Constitutional:       Appearance: Normal appearance. Comments: Looks younger than stated age    HENT:      Head: Normocephalic and atraumatic. Nose: Nose normal. No congestion or rhinorrhea. Mouth/Throat:      Mouth: Mucous membranes are moist.      Pharynx: No oropharyngeal exudate or posterior oropharyngeal erythema. Eyes:      General:         Right eye: No discharge. Left eye: No discharge. Extraocular Movements: Extraocular movements intact. Pupils: Pupils are equal, round, and reactive to light. Neck:      Vascular: No carotid bruit. Cardiovascular:      Rate and Rhythm: Normal rate and regular rhythm. Pulses: Normal pulses. Heart sounds: No murmur heard. Pulmonary:      Effort: No respiratory distress. Breath sounds: No stridor. No wheezing, rhonchi or rales. Chest:      Chest wall: No tenderness. Abdominal:      General: Abdomen is flat. Palpations: Abdomen is soft. There is no mass. Tenderness: There is no abdominal tenderness. Hernia: No hernia is present.  There is no hernia in the left inguinal area or right inguinal area. Genitourinary:     Penis: Normal and circumcised. No phimosis, paraphimosis, hypospadias, erythema, tenderness, discharge, swelling or lesions. Testes: Normal.      Epididymis:      Right: Normal.      Left: Normal.      Rectum: Normal.   Musculoskeletal:         General: No swelling, tenderness, deformity or signs of injury. Cervical back: Rigidity present. No muscular tenderness. Right lower leg: No edema. Left lower leg: No edema. Lymphadenopathy:      Cervical: No cervical adenopathy. Lower Body: No right inguinal adenopathy. No left inguinal adenopathy. Skin:     General: Skin is warm and dry. Capillary Refill: Capillary refill takes less than 2 seconds. Coloration: Skin is not jaundiced or pale. Findings: No bruising, erythema, lesion or rash. Neurological:      General: No focal deficit present. Mental Status: He is alert and oriented to person, place, and time. Cranial Nerves: No cranial nerve deficit. Sensory: No sensory deficit. Motor: No weakness. Coordination: Coordination normal.      Gait: Gait normal.      Deep Tendon Reflexes: Reflexes abnormal.   Psychiatric:         Mood and Affect: Mood normal.         Behavior: Behavior normal.         Thought Content: Thought content normal.         Judgment: Judgment normal.         Assessment / Plan:      Diagnosis Orders   1. Cervical spondylosis with myelopathy = on my skin examination it was found that he has a normal power in both upper and lower extremities and has normal pinprick sensation. I reassured patient that on my clinical examination he has no significant changes in his cervical spine, will be a candidate for MRI neck if he continues to have this pain    2. Quadriplegia, C1-C4 incomplete ([address]     3. Lumbar disc herniation     4. Benign prostatic hyperplasia with urinary frequency        Return in about 2 weeks (around 12/22/2021) for Follow Up.   No orders of the defined types were placed in this encounter. No orders of the defined types were placed in this encounter. Visit Information    Have you changed or started any medications since your last visit including any over-the-counter medicines, vitamins, or herbal medicines? no   Are you having any side effects from any of your medications? -  no  Have you stopped taking any of your medications? Is so, why? -  no    Have you seen any other physician or provider since your last visit? No  Have you had any other diagnostic tests since your last visit? No  Have you been seen in the emergency room and/or had an admission to a hospital since we last saw you? No  Have you had your routine dental cleaning in the past 6 months? yes -     Have you activated your [employer] account?  If not, what are your barriers?no    Patient Care Team:  Carmen Harper MD as PCP - General (Internal Medicine)  Carmen Harper MD as PCP - [facility]    Medical History Review  Past Medical, Family, and Social History reviewed and does not contribute to the patient presenting condition    Health Maintenance   Topic Date Due    COVID-19 Vaccine (1) Never done    Shingles Vaccine (1 of 2) Never done    Flu vaccine (1) 09/01/2021    Annual Wellness Visit (AWV)  03/11/2022    Colon cancer screen colonoscopy  10/24/2022    Lipid screen  03/15/2026    DTaP/Tdap/Td vaccine (2 - Td or Tdap) 03/15/2031    Pneumococcal 65+ years Vaccine  Completed    Hepatitis C screen  Completed    Hepatitis A vaccine  Aged Out    Hepatitis B vaccine  Aged Out    Hib vaccine  Aged Out    Meningococcal (ACWY) vaccine  Aged Out

## 2021-12-09 ASSESSMENT — ENCOUNTER SYMPTOMS
BACK PAIN: 0
EYES NEGATIVE: 1
GASTROINTESTINAL NEGATIVE: 1
RESPIRATORY NEGATIVE: 1

## 2022-06-02 ENCOUNTER — OFFICE VISIT (OUTPATIENT)
Dept: INTERNAL MEDICINE CLINIC | Age: 77
End: 2022-06-02
Payer: MEDICARE

## 2022-06-02 VITALS
OXYGEN SATURATION: 99 % | HEIGHT: 68 IN | BODY MASS INDEX: 17.98 KG/M2 | HEART RATE: 78 BPM | DIASTOLIC BLOOD PRESSURE: 82 MMHG | WEIGHT: 118.6 LBS | SYSTOLIC BLOOD PRESSURE: 128 MMHG

## 2022-06-02 DIAGNOSIS — Z12.5 PROSTATE CANCER SCREENING: ICD-10-CM

## 2022-06-02 DIAGNOSIS — R35.1 NOCTURIA: Primary | ICD-10-CM

## 2022-06-02 DIAGNOSIS — Z13.220 LIPID SCREENING: ICD-10-CM

## 2022-06-02 DIAGNOSIS — R53.83 FATIGUE, UNSPECIFIED TYPE: ICD-10-CM

## 2022-06-02 PROCEDURE — 99213 OFFICE O/P EST LOW 20 MIN: CPT | Performed by: NURSE PRACTITIONER

## 2022-06-02 PROCEDURE — 1123F ACP DISCUSS/DSCN MKR DOCD: CPT | Performed by: NURSE PRACTITIONER

## 2022-06-02 ASSESSMENT — ENCOUNTER SYMPTOMS
NAUSEA: 0
DIARRHEA: 0
SHORTNESS OF BREATH: 0
SORE THROAT: 0
TROUBLE SWALLOWING: 0
CONSTIPATION: 0
ABDOMINAL PAIN: 0
EYE DISCHARGE: 0
WHEEZING: 0
COUGH: 0

## 2022-06-02 ASSESSMENT — PATIENT HEALTH QUESTIONNAIRE - PHQ9
SUM OF ALL RESPONSES TO PHQ QUESTIONS 1-9: 0
SUM OF ALL RESPONSES TO PHQ QUESTIONS 1-9: 0
SUM OF ALL RESPONSES TO PHQ9 QUESTIONS 1 & 2: 0
1. LITTLE INTEREST OR PLEASURE IN DOING THINGS: 0
SUM OF ALL RESPONSES TO PHQ QUESTIONS 1-9: 0
SUM OF ALL RESPONSES TO PHQ QUESTIONS 1-9: 0
2. FEELING DOWN, DEPRESSED OR HOPELESS: 0

## 2022-06-02 NOTE — PROGRESS NOTES
Visit Information    Have you changed or started any medications since your last visit including any over-the-counter medicines, vitamins, or herbal medicines? no   Are you having any side effects from any of your medications? -  no  Have you stopped taking any of your medications? Is so, why? -  no    Have you seen any other physician or provider since your last visit? Yes - Records Obtained  Have you had any other diagnostic tests since your last visit? No  Have you been seen in the emergency room and/or had an admission to a hospital since we last saw you? No  Have you had your routine dental cleaning in the past 6 months? no    Have you activated your [employer] account? If not, what are your barriers?  No: not interested     Patient Care Team:  VINICIO Dixon CNP as PCP - General (Internal Medicine)  VINICIO Dixon CNP as PCP - [facility] Provider    Medical History Review  Past Medical, Family, and Social History reviewed and does contribute to the patient presenting condition    Health Maintenance   Topic Date Due    COVID-19 Vaccine (1) Never done    Shingles vaccine (1 of 2) Never done   [employer] Visit (AWV)  03/11/2022    Flu vaccine (Season Ended) 09/01/2022    Depression Screen  06/02/2023    Colorectal Cancer Screen  03/23/2024    Lipids  03/15/2026    DTaP/Tdap/Td vaccine (2 - Td or Tdap) 03/15/2031    Pneumococcal 65+ years Vaccine  Completed    Hepatitis C screen  Completed    Hepatitis A vaccine  Aged Out    Hepatitis B vaccine  Aged Out    Hib vaccine  Aged Out    Meningococcal (ACWY) vaccine  Aged Out         [address]  [place] [address]  Dept: [phone]  Dept Fax: [phone]    Office Progress/Follow Up Note  Date of patient's visit: 6/2/2022   Patient's Name:  Bhupinder Padilla  YOB: 1945            Patient Care Team:  VINICIO Dixon CNP as PCP - General (Internal Medicine)  Kimberlyn Cai VINICIO Angeles - CNP as PCP - [facility] Empaneled Provider    REASON FOR VISIT: Establish Care (former [facility] [place] patient) and Urinary Frequency (going more frequently at night)        HISTORY OF PRESENT ILLNESS:      History was obtained from the patient. Kala Jiménez is a 78 y.o. is here for Establish Care (former [facility] [place] patient) and Urinary Frequency (going more frequently at night)    Diet-lots of fruits and veggies and water, tries to eat healthy  Exercise-plays with 11year old granddaughter    Patient Active Problem List   Diagnosis    Cervical spondylosis with myelopathy    Ganglion cyst    Lumbar disc herniation    Benign prostatic hyperplasia with urinary frequency       Allergies   Allergen Reactions    No Known Allergies          MEDICATIONS:     Current Outpatient Medications   Medication Sig Dispense Refill    ibuprofen (ADVIL;MOTRIN) 400 MG tablet Take 400 mg by mouth every 6 hours as needed for Pain       No current facility-administered medications for this visit. SOCIAL HISTORY    Reviewed and updated. Kacie Wood  reports that he has never smoked. He has never used smokeless tobacco.    FAMILY HISTORY:    Reviewed and updated. family history is not on file. REVIEW OF SYSTEMS:      Review of Systems   Constitutional: Positive for fatigue. Negative for chills and fever. HENT: Negative for congestion, ear pain, sore throat and trouble swallowing. Eyes: Negative for discharge and visual disturbance. Respiratory: Negative for cough, shortness of breath and wheezing. Cardiovascular: Negative for chest pain, palpitations and leg swelling. Gastrointestinal: Negative for abdominal pain, constipation, diarrhea and nausea. Genitourinary: Negative for dysuria, frequency and hematuria. Cannot hold urine as long as he used to but is still sleeping about 7 hours without problem   Musculoskeletal: Positive for neck pain (occasional).  Negative for arthralgias, gait problem, joint swelling and myalgias. Skin: Negative for rash. Neurological: Negative for dizziness and headaches. Psychiatric/Behavioral: Negative for sleep disturbance. The patient is not nervous/anxious. PHYSICAL EXAM:      Vitals:    06/02/22 1351   BP: 128/82   Pulse: 78   SpO2: 99%   Weight: 118 lb 9.6 oz (53.8 kg)   Height: 5' 8\" (1.727 m)     BP Readings from Last 3 Encounters:   06/02/22 128/82   12/08/21 136/78   03/10/21 122/74        Physical Exam  Constitutional:       General: He is not in acute distress. Appearance: Normal appearance. He is well-developed. Comments: thin   HENT:      Head: Normocephalic and atraumatic. Right Ear: External ear normal.      Left Ear: External ear normal.      Nose: Nose normal.      Mouth/Throat:      Mouth: Mucous membranes are moist.      Pharynx: Oropharynx is clear. Eyes:      General:         Right eye: No discharge. Left eye: No discharge. Conjunctiva/sclera: Conjunctivae normal.      Pupils: Pupils are equal, round, and reactive to light. Neck:      Thyroid: No thyromegaly. Cardiovascular:      Rate and Rhythm: Normal rate and regular rhythm. Pulses: Normal pulses. Heart sounds: Normal heart sounds. No murmur heard. Pulmonary:      Effort: Pulmonary effort is normal.      Breath sounds: Normal breath sounds. No wheezing. Abdominal:      General: Bowel sounds are normal. There is no distension. Palpations: Abdomen is soft. Tenderness: There is no abdominal tenderness. Musculoskeletal:      Cervical back: Normal range of motion and neck supple. No tenderness. Thoracic back: No tenderness. Normal range of motion. Lumbar back: No tenderness. Normal range of motion. Right lower leg: No edema. Left lower leg: No edema. Lymphadenopathy:      Cervical: No cervical adenopathy. Skin:     General: Skin is warm and dry.    Neurological:      Mental Status: He is alert and oriented to person, place, and time. Gait: Gait normal.   Psychiatric:         Mood and Affect: Mood normal.         Behavior: Behavior normal.          LABORATORY FINDINGS:    CBC:   Lab Results   Component Value Date    WBC 6.1 03/15/2021    HGB 14.0 03/15/2021     03/15/2021     BMP:   Lab Results   Component Value Date     03/15/2021    K 4.3 03/15/2021     03/15/2021    CO2 25 03/15/2021    BUN 22 03/15/2021    CREATININE 0.97 03/15/2021    GLUCOSE 95 03/15/2021     HEMOGLOBIN A1C: No results found for: LABA1C  FASTING LIPID PANEL:   Lab Results   Component Value Date    CHOL 196 11/02/2017    HDL 57 03/15/2021    LDLCHOLESTEROL 146 (H) 03/15/2021    TRIG 90 11/02/2017       ASSESSMENT AND PLAN:      Visit Diagnoses and Associated Orders     Nocturia    -  Primary    Mild, will check PSA, labs, advised to stop drinking fluids several hours prior to hs, cut back on caffeine    Urinalysis [[id number] Custom]   - Future Order         Fatigue, unspecified type        CBC with Auto Differential [[id number] Custom]   - Future Order    Comprehensive Metabolic Panel [[id number] Custom]   - Future Order    TSH with Reflex [[id number] Custom]   - Future Order         Lipid screening        Lipid Panel [[id number] Custom]   - Future Order         Prostate cancer screening        PSA Screening [ Custom]   - Future Order                FOLLOW UP AND INSTRUCTIONS:     Return for routine follow up, or sooner if needed. · Vivian Cruz received counseling on the following healthy behaviors: nutrition and exercise    · Discussed use, benefit, and side effects of prescribed medications. Barriers to medication compliance addressed. All patient questions answered. Pt voiced understanding. VINICIO Finley - CNP    6/15/2022, 12:00 PM    Please note that this chart was generated using voice recognition Dragon dictation software.   Although every effort was made to ensure the accuracy of this automatedtranscription, some errors in transcription may have occurred.

## 2022-07-13 ENCOUNTER — TELEPHONE (OUTPATIENT)
Dept: INTERNAL MEDICINE CLINIC | Age: 77
End: 2022-07-13

## 2022-07-13 DIAGNOSIS — E78.00 ELEVATED CHOLESTEROL: Primary | ICD-10-CM

## 2022-07-13 DIAGNOSIS — E78.00 ELEVATED LDL CHOLESTEROL LEVEL: ICD-10-CM

## 2022-07-13 NOTE — TELEPHONE ENCOUNTER
Patient came in office today and states that he was unable to get his fasting lipid done due to the wrong diagnosis code being attached to the order. I have re pended his lab. Would you like to sign for patient? PCP out of office.

## 2022-08-10 ENCOUNTER — HOSPITAL ENCOUNTER (OUTPATIENT)
Age: 77
Setting detail: SPECIMEN
Discharge: HOME | End: 2022-08-10

## 2022-08-10 DIAGNOSIS — Z12.5 PROSTATE CANCER SCREENING: ICD-10-CM

## 2022-08-10 DIAGNOSIS — R35.1 NOCTURIA: ICD-10-CM

## 2022-08-10 DIAGNOSIS — R53.83 FATIGUE, UNSPECIFIED TYPE: ICD-10-CM

## 2022-08-10 DIAGNOSIS — Z13.220 LIPID SCREENING: ICD-10-CM

## 2022-08-10 LAB
ABSOLUTE EOS #: 0.19 K/UL (ref 0–0.44)
ABSOLUTE IMMATURE GRANULOCYTE: <0.03 K/UL (ref 0–0.3)
ABSOLUTE LYMPH #: 1.88 K/UL (ref 1.1–3.7)
ABSOLUTE MONO #: 0.75 K/UL (ref 0.1–1.2)
BASOPHILS # BLD: 1 % (ref 0–2)
BASOPHILS ABSOLUTE: 0.05 K/UL (ref 0–0.2)
BILIRUBIN URINE: NEGATIVE
COLOR: YELLOW
COMMENT UA: NORMAL
EOSINOPHILS RELATIVE PERCENT: 3 % (ref 1–4)
GLUCOSE URINE: NEGATIVE
HCT VFR BLD CALC: 45.6 % (ref 40.7–50.3)
HEMOGLOBIN: 14 G/DL (ref 13–17)
IMMATURE GRANULOCYTES: 0 %
KETONES, URINE: NEGATIVE
LEUKOCYTE ESTERASE, URINE: NEGATIVE
LYMPHOCYTES # BLD: 28 % (ref 24–43)
MCH RBC QN AUTO: 29.9 PG (ref 25.2–33.5)
MCHC RBC AUTO-ENTMCNC: 30.7 G/DL (ref 28.4–34.8)
MCV RBC AUTO: 97.2 FL (ref 82.6–102.9)
MONOCYTES # BLD: 11 % (ref 3–12)
NITRITE, URINE: NEGATIVE
NRBC AUTOMATED: 0 PER 100 WBC
PDW BLD-RTO: 13.3 % (ref 11.8–14.4)
PH UA: 5.5 (ref 5–8)
PLATELET # BLD: 183 K/UL (ref 138–453)
PMV BLD AUTO: 10.3 FL (ref 8.1–13.5)
PROTEIN UA: NEGATIVE
RBC # BLD: 4.69 M/UL (ref 4.21–5.77)
SEG NEUTROPHILS: 57 % (ref 36–65)
SEGMENTED NEUTROPHILS ABSOLUTE COUNT: 3.83 K/UL (ref 1.5–8.1)
SPECIFIC GRAVITY UA: 1.02 (ref 1–1.03)
TURBIDITY: CLEAR
URINE HGB: NEGATIVE
UROBILINOGEN, URINE: NORMAL
WBC # BLD: 6.7 K/UL (ref 3.5–11.3)

## 2022-08-11 LAB
ALBUMIN SERPL-MCNC: 4.3 G/DL (ref 3.5–5.2)
ALBUMIN/GLOBULIN RATIO: 1.4 (ref 1–2.5)
ALP BLD-CCNC: 61 U/L (ref 40–129)
ALT SERPL-CCNC: 12 U/L (ref 5–41)
ANION GAP SERPL CALCULATED.3IONS-SCNC: 14 MMOL/L (ref 9–17)
AST SERPL-CCNC: 19 U/L
BILIRUB SERPL-MCNC: 0.62 MG/DL (ref 0.3–1.2)
BUN BLDV-MCNC: 25 MG/DL (ref 8–23)
CALCIUM SERPL-MCNC: 9.3 MG/DL (ref 8.6–10.4)
CHLORIDE BLD-SCNC: 105 MMOL/L (ref 98–107)
CHOLESTEROL/HDL RATIO: 4.2
CHOLESTEROL: 217 MG/DL
CO2: 22 MMOL/L (ref 20–31)
CREAT SERPL-MCNC: 0.95 MG/DL (ref 0.7–1.2)
GFR AFRICAN AMERICAN: >60 ML/MIN
GFR NON-AFRICAN AMERICAN: >60 ML/MIN
GFR SERPL CREATININE-BSD FRML MDRD: ABNORMAL ML/MIN/{1.73_M2}
GLUCOSE BLD-MCNC: 83 MG/DL (ref 70–99)
HDLC SERPL-MCNC: 52 MG/DL
LDL CHOLESTEROL: 148 MG/DL (ref 0–130)
POTASSIUM SERPL-SCNC: 4.6 MMOL/L (ref 3.7–5.3)
PROSTATE SPECIFIC ANTIGEN: 1.72 NG/ML
SODIUM BLD-SCNC: 141 MMOL/L (ref 135–144)
TOTAL PROTEIN: 7.3 G/DL (ref 6.4–8.3)
TRIGL SERPL-MCNC: 87 MG/DL
TSH SERPL DL<=0.05 MIU/L-ACNC: 1.99 UIU/ML (ref 0.3–5)

## 2023-02-02 ENCOUNTER — TELEPHONE (OUTPATIENT)
Dept: INTERNAL MEDICINE CLINIC | Age: 78
End: 2023-02-02

## 2023-02-02 NOTE — TELEPHONE ENCOUNTER
Called and spoke to patient to schedule his AWV appt for 2023. Ale Munguia declined the appointment at this time, he would like to hold off for now.    Kevin Velarde to call the office when he is ready to schedule

## 2023-04-07 ENCOUNTER — HOSPITAL ENCOUNTER (OUTPATIENT)
Age: 78
Setting detail: SPECIMEN
Discharge: HOME | End: 2023-04-07

## 2023-04-07 DIAGNOSIS — Z12.5 PROSTATE CANCER SCREENING: ICD-10-CM

## 2023-04-07 DIAGNOSIS — N30.00 ACUTE CYSTITIS WITHOUT HEMATURIA: ICD-10-CM

## 2023-04-07 DIAGNOSIS — R53.83 FATIGUE, UNSPECIFIED TYPE: ICD-10-CM

## 2023-04-07 DIAGNOSIS — I25.10 CORONARY ARTERY DISEASE INVOLVING NATIVE HEART WITHOUT ANGINA PECTORIS, UNSPECIFIED VESSEL OR LESION TYPE: ICD-10-CM

## 2023-04-07 LAB
ABSOLUTE EOS #: 0.1 K/UL (ref 0–0.44)
ABSOLUTE IMMATURE GRANULOCYTE: 0.07 K/UL (ref 0–0.3)
ABSOLUTE LYMPH #: 1.57 K/UL (ref 1.1–3.7)
ABSOLUTE MONO #: 0.79 K/UL (ref 0.1–1.2)
ALBUMIN SERPL-MCNC: 4.2 G/DL (ref 3.5–5.2)
ALBUMIN/GLOBULIN RATIO: 1.2 (ref 1–2.5)
ALP SERPL-CCNC: 78 U/L (ref 40–129)
ALT SERPL-CCNC: 36 U/L (ref 5–41)
ANION GAP SERPL CALCULATED.3IONS-SCNC: 14 MMOL/L (ref 9–17)
AST SERPL-CCNC: 18 U/L
BASOPHILS # BLD: 1 % (ref 0–2)
BASOPHILS ABSOLUTE: 0.06 K/UL (ref 0–0.2)
BILIRUB SERPL-MCNC: 0.3 MG/DL (ref 0.3–1.2)
BILIRUBIN URINE: NEGATIVE
BUN SERPL-MCNC: 24 MG/DL (ref 8–23)
CALCIUM SERPL-MCNC: 9.5 MG/DL (ref 8.6–10.4)
CHLORIDE SERPL-SCNC: 99 MMOL/L (ref 98–107)
CHOLEST SERPL-MCNC: 157 MG/DL
CHOLESTEROL/HDL RATIO: 3.8
CO2 SERPL-SCNC: 24 MMOL/L (ref 20–31)
COLOR: YELLOW
COMMENT UA: NORMAL
CREAT SERPL-MCNC: 1.07 MG/DL (ref 0.7–1.2)
EOSINOPHILS RELATIVE PERCENT: 1 % (ref 1–4)
GFR SERPL CREATININE-BSD FRML MDRD: >60 ML/MIN/1.73M2
GLUCOSE SERPL-MCNC: 88 MG/DL (ref 70–99)
GLUCOSE UR STRIP.AUTO-MCNC: NEGATIVE MG/DL
HCT VFR BLD AUTO: 42.4 % (ref 40.7–50.3)
HDLC SERPL-MCNC: 41 MG/DL
HGB BLD-MCNC: 13.1 G/DL (ref 13–17)
IMMATURE GRANULOCYTES: 1 %
KETONES UR STRIP.AUTO-MCNC: NEGATIVE MG/DL
LDLC SERPL CALC-MCNC: 96 MG/DL (ref 0–130)
LEUKOCYTE ESTERASE UR QL STRIP.AUTO: NEGATIVE
LYMPHOCYTES # BLD: 17 % (ref 24–43)
MCH RBC QN AUTO: 29.8 PG (ref 25.2–33.5)
MCHC RBC AUTO-ENTMCNC: 30.9 G/DL (ref 28.4–34.8)
MCV RBC AUTO: 96.6 FL (ref 82.6–102.9)
MONOCYTES # BLD: 9 % (ref 3–12)
NITRITE UR QL STRIP.AUTO: NEGATIVE
NRBC AUTOMATED: 0 PER 100 WBC
PDW BLD-RTO: 13.2 % (ref 11.8–14.4)
PLATELET # BLD AUTO: 336 K/UL (ref 138–453)
PMV BLD AUTO: 9.3 FL (ref 8.1–13.5)
POTASSIUM SERPL-SCNC: 5.2 MMOL/L (ref 3.7–5.3)
PROSTATE SPECIFIC ANTIGEN: 10.43 NG/ML
PROT SERPL-MCNC: 7.6 G/DL (ref 6.4–8.3)
PROT UR STRIP.AUTO-MCNC: 5.5 MG/DL (ref 5–8)
PROT UR STRIP.AUTO-MCNC: NEGATIVE MG/DL
RBC # BLD: 4.39 M/UL (ref 4.21–5.77)
SEG NEUTROPHILS: 71 % (ref 36–65)
SEGMENTED NEUTROPHILS ABSOLUTE COUNT: 6.74 K/UL (ref 1.5–8.1)
SODIUM SERPL-SCNC: 137 MMOL/L (ref 135–144)
SPECIFIC GRAVITY UA: 1.02 (ref 1–1.03)
TRIGL SERPL-MCNC: 100 MG/DL
TSH SERPL-ACNC: 4.5 UIU/ML (ref 0.3–5)
TURBIDITY: CLEAR
URINE HGB: NEGATIVE
UROBILINOGEN, URINE: NORMAL
WBC # BLD AUTO: 9.3 K/UL (ref 3.5–11.3)

## 2023-04-17 ENCOUNTER — TELEPHONE (OUTPATIENT)
Dept: INTERNAL MEDICINE CLINIC | Age: 78
End: 2023-04-17

## 2023-04-17 NOTE — TELEPHONE ENCOUNTER
NAHED 04/04/2023    Patient is complaining of his kidneys bothering him. Sx   Urinating every 2 hrs  Pain  No fever  Feeling that he does not empty out his bladder. Patient does not see the urologist for 10 days.     MANDO Fernandez

## 2023-04-18 ENCOUNTER — HOSPITAL ENCOUNTER (OUTPATIENT)
Age: 78
Setting detail: SPECIMEN
Discharge: HOME | End: 2023-04-18

## 2023-04-18 ENCOUNTER — OFFICE VISIT (OUTPATIENT)
Dept: INTERNAL MEDICINE CLINIC | Age: 78
End: 2023-04-18
Payer: MEDICARE

## 2023-04-18 ENCOUNTER — HOSPITAL ENCOUNTER (OUTPATIENT)
Dept: CT IMAGING | Age: 78
Discharge: HOME | End: 2023-04-20
Payer: MEDICARE

## 2023-04-18 VITALS
HEIGHT: 68 IN | OXYGEN SATURATION: 97 % | HEART RATE: 87 BPM | WEIGHT: 120 LBS | DIASTOLIC BLOOD PRESSURE: 70 MMHG | BODY MASS INDEX: 18.19 KG/M2 | SYSTOLIC BLOOD PRESSURE: 120 MMHG

## 2023-04-18 DIAGNOSIS — R35.0 URINARY FREQUENCY: ICD-10-CM

## 2023-04-18 DIAGNOSIS — R93.89 ABNORMAL CT OF THE CHEST: ICD-10-CM

## 2023-04-18 DIAGNOSIS — M54.50 ACUTE LEFT-SIDED LOW BACK PAIN WITHOUT SCIATICA: ICD-10-CM

## 2023-04-18 DIAGNOSIS — R31.9 HEMATURIA, UNSPECIFIED TYPE: ICD-10-CM

## 2023-04-18 DIAGNOSIS — K76.9 LESION OF LIVER: ICD-10-CM

## 2023-04-18 DIAGNOSIS — R35.0 URINARY FREQUENCY: Primary | ICD-10-CM

## 2023-04-18 DIAGNOSIS — K86.9 PANCREATIC LESION: ICD-10-CM

## 2023-04-18 LAB
BILIRUBIN, POC: ABNORMAL
BLOOD URINE, POC: ABNORMAL
CLARITY, POC: ABNORMAL
COLOR, POC: YELLOW
GLUCOSE URINE, POC: ABNORMAL
KETONES, POC: ABNORMAL
LEUKOCYTE EST, POC: ABNORMAL
NITRITE, POC: ABNORMAL
PH, POC: 5.5
PROTEIN, POC: ABNORMAL
SPECIFIC GRAVITY, POC: 1.02
UROBILINOGEN, POC: ABNORMAL

## 2023-04-18 PROCEDURE — 74176 CT ABD & PELVIS W/O CONTRAST: CPT

## 2023-04-18 PROCEDURE — 99213 OFFICE O/P EST LOW 20 MIN: CPT | Performed by: NURSE PRACTITIONER

## 2023-04-18 PROCEDURE — 1123F ACP DISCUSS/DSCN MKR DOCD: CPT | Performed by: NURSE PRACTITIONER

## 2023-04-18 PROCEDURE — 81002 URINALYSIS NONAUTO W/O SCOPE: CPT | Performed by: NURSE PRACTITIONER

## 2023-04-20 LAB
MICROORGANISM SPEC CULT: NO GROWTH
SPECIMEN DESCRIPTION: NORMAL

## 2023-04-21 ENCOUNTER — HOSPITAL ENCOUNTER (OUTPATIENT)
Dept: MRI IMAGING | Facility: CLINIC | Age: 78
End: 2023-04-21
Payer: MEDICARE

## 2023-04-21 DIAGNOSIS — M25.511 RIGHT SHOULDER PAIN, UNSPECIFIED CHRONICITY: ICD-10-CM

## 2023-04-21 PROBLEM — N13.8 BPH WITH OBSTRUCTION/LOWER URINARY TRACT SYMPTOMS: Status: ACTIVE | Noted: 2020-01-21

## 2023-04-21 PROBLEM — R35.1 NOCTURIA: Status: ACTIVE | Noted: 2023-04-21

## 2023-04-21 PROBLEM — R97.20 ELEVATED PSA: Status: ACTIVE | Noted: 2023-04-21

## 2023-04-21 PROBLEM — R31.29 MICROHEMATURIA: Status: ACTIVE | Noted: 2023-04-21

## 2023-04-21 PROBLEM — N20.0 BILATERAL RENAL STONES: Status: ACTIVE | Noted: 2023-04-21

## 2023-04-21 PROCEDURE — 73221 MRI JOINT UPR EXTREM W/O DYE: CPT

## 2023-04-24 ENCOUNTER — HOSPITAL ENCOUNTER (OUTPATIENT)
Dept: MRI IMAGING | Facility: CLINIC | Age: 78
Discharge: HOME | End: 2023-04-26
Payer: MEDICARE

## 2023-04-24 DIAGNOSIS — R93.89 ABNORMAL CT OF THE CHEST: ICD-10-CM

## 2023-04-24 DIAGNOSIS — K86.9 PANCREATIC LESION: ICD-10-CM

## 2023-04-24 DIAGNOSIS — K76.9 LESION OF LIVER: ICD-10-CM

## 2023-04-24 PROCEDURE — 6360000004 HC RX CONTRAST MEDICATION: Performed by: NURSE PRACTITIONER

## 2023-04-24 PROCEDURE — A9579 GAD-BASE MR CONTRAST NOS,1ML: HCPCS | Performed by: NURSE PRACTITIONER

## 2023-04-24 PROCEDURE — 2580000003 HC RX 258: Performed by: NURSE PRACTITIONER

## 2023-04-24 PROCEDURE — 74183 MRI ABD W/O CNTR FLWD CNTR: CPT

## 2023-04-24 RX ORDER — SODIUM CHLORIDE 0.9 % (FLUSH) 0.9 %
10 SYRINGE (ML) INJECTION PRN
Status: COMPLETED | OUTPATIENT
Start: 2023-04-24 | End: 2023-04-24

## 2023-04-24 RX ADMIN — GADOTERIDOL 12 ML: 279.3 INJECTION, SOLUTION INTRAVENOUS at 11:10

## 2023-04-24 RX ADMIN — Medication 10 ML: at 11:10

## 2023-05-01 ASSESSMENT — ENCOUNTER SYMPTOMS
SHORTNESS OF BREATH: 0
COUGH: 0
BACK PAIN: 1
NAUSEA: 0
WHEEZING: 0
ABDOMINAL PAIN: 0
VOMITING: 0

## 2023-05-04 ENCOUNTER — PATIENT MESSAGE (OUTPATIENT)
Dept: INTERNAL MEDICINE CLINIC | Age: 78
End: 2023-05-04

## 2023-05-04 DIAGNOSIS — S12.100D CLOSED DISPLACED FRACTURE OF SECOND CERVICAL VERTEBRA WITH ROUTINE HEALING, UNSPECIFIED FRACTURE MORPHOLOGY, SUBSEQUENT ENCOUNTER: Primary | ICD-10-CM

## 2023-05-15 ENCOUNTER — HOSPITAL ENCOUNTER (OUTPATIENT)
Dept: GENERAL RADIOLOGY | Facility: CLINIC | Age: 78
Discharge: HOME | End: 2023-05-17
Payer: MEDICARE

## 2023-05-15 ENCOUNTER — HOSPITAL ENCOUNTER (OUTPATIENT)
Facility: CLINIC | Age: 78
Discharge: HOME | End: 2023-05-17
Payer: MEDICARE

## 2023-05-15 DIAGNOSIS — S12.100D CLOSED DISPLACED FRACTURE OF SECOND CERVICAL VERTEBRA WITH ROUTINE HEALING, UNSPECIFIED FRACTURE MORPHOLOGY, SUBSEQUENT ENCOUNTER: ICD-10-CM

## 2023-05-15 PROCEDURE — 72050 X-RAY EXAM NECK SPINE 4/5VWS: CPT

## 2023-05-15 NOTE — PROGRESS NOTES
Preoperative Instructions:    Stop eating solid foods at midnight the night prior to your surgery. Stop drinking clear liquids at midnight the night prior to your surgery. Arrive at the surgery center (3rd entrance) on _____[id number]__________ by __0800am_____________. Please stop any blood thinning medications as directed by your surgeon or prescribing physician. Failure to stop certain medications may interfere with your scheduled surgery. These may include: Aspirin, Coumadin, Plavix, NSAIDS (Motrin, Aleve, Advil, Mobic, Celebrex), Eliquis, Pradaxa, Xarelto, Fish oil, and herbal supplements. You may continue the rest of your medications through the night before surgery unless instructed otherwise. Day of surgery please take only the following medication(s) with a small sip of water:None    Please shower with CHG soap the night before and the morning of surgery. Please use and bring inhalers the day of surgery. N/A      Reminders:  -If you are going home the day of your procedure, you will need a family member or friend to stay during the procedure and drive you home after your procedure. Your  must be 25years of age or older and able to sign off on your discharge instructions.    -If you are going home the same day of your surgery, someone must remain with you for the first 24 hours after your surgery if you receive sedation or anesthesia.      -Please do not wear any jewelery , lotions, contacts or body piercing the day of surgery

## 2023-05-16 ENCOUNTER — ANESTHESIA EVENT (OUTPATIENT)
Dept: OPERATING ROOM | Age: 78
End: 2023-05-16
Payer: MEDICARE

## 2023-05-17 ENCOUNTER — ANESTHESIA (OUTPATIENT)
Dept: OPERATING ROOM | Age: 78
End: 2023-05-17
Payer: MEDICARE

## 2023-05-17 ENCOUNTER — HOSPITAL ENCOUNTER (OUTPATIENT)
Age: 78
Setting detail: OUTPATIENT SURGERY
Discharge: HOME | End: 2023-05-17
Attending: ORTHOPAEDIC SURGERY | Admitting: ORTHOPAEDIC SURGERY
Payer: MEDICARE

## 2023-05-17 VITALS
HEIGHT: 69 IN | DIASTOLIC BLOOD PRESSURE: 70 MMHG | SYSTOLIC BLOOD PRESSURE: 127 MMHG | BODY MASS INDEX: 18.22 KG/M2 | RESPIRATION RATE: 11 BRPM | HEART RATE: 94 BPM | WEIGHT: 123 LBS | OXYGEN SATURATION: 96 % | TEMPERATURE: 97.7 F

## 2023-05-17 DIAGNOSIS — M75.121 COMPLETE TEAR OF RIGHT ROTATOR CUFF, UNSPECIFIED WHETHER TRAUMATIC: Primary | ICD-10-CM

## 2023-05-17 PROCEDURE — 3700000000 HC ANESTHESIA ATTENDED CARE: Performed by: ORTHOPAEDIC SURGERY

## 2023-05-17 PROCEDURE — 2500000003 HC RX 250 WO HCPCS: Performed by: ANESTHESIOLOGY

## 2023-05-17 PROCEDURE — 6360000002 HC RX W HCPCS: Performed by: ANESTHESIOLOGY

## 2023-05-17 PROCEDURE — 3700000001 HC ADD 15 MINUTES (ANESTHESIA): Performed by: ORTHOPAEDIC SURGERY

## 2023-05-17 PROCEDURE — 2720000010 HC SURG SUPPLY STERILE: Performed by: ORTHOPAEDIC SURGERY

## 2023-05-17 PROCEDURE — 2580000003 HC RX 258: Performed by: ANESTHESIOLOGY

## 2023-05-17 PROCEDURE — C9290 INJ, BUPIVACAINE LIPOSOME: HCPCS | Performed by: ANESTHESIOLOGY

## 2023-05-17 PROCEDURE — L3650 SO 8 ABD RESTRAINT PRE OTS: HCPCS | Performed by: ORTHOPAEDIC SURGERY

## 2023-05-17 PROCEDURE — 3600000004 HC SURGERY LEVEL 4 BASE: Performed by: ORTHOPAEDIC SURGERY

## 2023-05-17 PROCEDURE — 7100000001 HC PACU RECOVERY - ADDTL 15 MIN: Performed by: ORTHOPAEDIC SURGERY

## 2023-05-17 PROCEDURE — 64415 NJX AA&/STRD BRCH PLXS IMG: CPT | Performed by: ANESTHESIOLOGY

## 2023-05-17 PROCEDURE — 2580000003 HC RX 258: Performed by: ORTHOPAEDIC SURGERY

## 2023-05-17 PROCEDURE — 7100000010 HC PHASE II RECOVERY - FIRST 15 MIN: Performed by: ORTHOPAEDIC SURGERY

## 2023-05-17 PROCEDURE — 6360000002 HC RX W HCPCS

## 2023-05-17 PROCEDURE — 3600000014 HC SURGERY LEVEL 4 ADDTL 15MIN: Performed by: ORTHOPAEDIC SURGERY

## 2023-05-17 PROCEDURE — 7100000000 HC PACU RECOVERY - FIRST 15 MIN: Performed by: ORTHOPAEDIC SURGERY

## 2023-05-17 PROCEDURE — 2709999900 HC NON-CHARGEABLE SUPPLY: Performed by: ORTHOPAEDIC SURGERY

## 2023-05-17 PROCEDURE — 7100000011 HC PHASE II RECOVERY - ADDTL 15 MIN: Performed by: ORTHOPAEDIC SURGERY

## 2023-05-17 PROCEDURE — 6360000002 HC RX W HCPCS: Performed by: ORTHOPAEDIC SURGERY

## 2023-05-17 PROCEDURE — C1713 ANCHOR/SCREW BN/BN,TIS/BN: HCPCS | Performed by: ORTHOPAEDIC SURGERY

## 2023-05-17 DEVICE — ANCHOR BONE SP FBRTAK RC TGRTPE WH/BLK: Type: IMPLANTABLE DEVICE | Site: SHOULDER | Status: FUNCTIONAL

## 2023-05-17 DEVICE — ANCHOR BIOCOMPOSITE KNOTLESS SL  4.75X19.1MM W/#2 BL SUTURE: Type: IMPLANTABLE DEVICE | Site: SHOULDER | Status: FUNCTIONAL

## 2023-05-17 DEVICE — ANCHOR BONE SP FBRTAK RC TGRTPE BLU: Type: IMPLANTABLE DEVICE | Site: SHOULDER | Status: FUNCTIONAL

## 2023-05-17 DEVICE — ANCHOR SUT L19.1MM DIA6.25MM CLS EYELET TENODESIS FOR PROX: Type: IMPLANTABLE DEVICE | Site: SHOULDER | Status: FUNCTIONAL

## 2023-05-17 RX ORDER — DROPERIDOL 2.5 MG/ML
0.62 INJECTION, SOLUTION INTRAMUSCULAR; INTRAVENOUS
Status: DISCONTINUED | OUTPATIENT
Start: 2023-05-17 | End: 2023-05-17 | Stop reason: HOSPADM

## 2023-05-17 RX ORDER — LABETALOL HYDROCHLORIDE 5 MG/ML
10 INJECTION, SOLUTION INTRAVENOUS
Status: DISCONTINUED | OUTPATIENT
Start: 2023-05-17 | End: 2023-05-17 | Stop reason: HOSPADM

## 2023-05-17 RX ORDER — NEOSTIGMINE METHYLSULFATE 5 MG/5 ML
SYRINGE (ML) INTRAVENOUS PRN
Status: DISCONTINUED | OUTPATIENT
Start: 2023-05-17 | End: 2023-05-17 | Stop reason: SDUPTHER

## 2023-05-17 RX ORDER — MEPERIDINE HYDROCHLORIDE 50 MG/ML
12.5 INJECTION INTRAMUSCULAR; INTRAVENOUS; SUBCUTANEOUS EVERY 5 MIN PRN
Status: DISCONTINUED | OUTPATIENT
Start: 2023-05-17 | End: 2023-05-17 | Stop reason: HOSPADM

## 2023-05-17 RX ORDER — MIDAZOLAM HYDROCHLORIDE 2 MG/2ML
2 INJECTION, SOLUTION INTRAMUSCULAR; INTRAVENOUS ONCE
Status: COMPLETED | OUTPATIENT
Start: 2023-05-17 | End: 2023-05-17

## 2023-05-17 RX ORDER — SODIUM CHLORIDE 9 MG/ML
25 INJECTION, SOLUTION INTRAVENOUS PRN
Status: DISCONTINUED | OUTPATIENT
Start: 2023-05-17 | End: 2023-05-17 | Stop reason: HOSPADM

## 2023-05-17 RX ORDER — PROMETHAZINE HYDROCHLORIDE 25 MG/ML
6.25 INJECTION, SOLUTION INTRAMUSCULAR; INTRAVENOUS EVERY 5 MIN PRN
Status: DISCONTINUED | OUTPATIENT
Start: 2023-05-17 | End: 2023-05-17 | Stop reason: HOSPADM

## 2023-05-17 RX ORDER — GLYCOPYRROLATE 0.2 MG/ML
INJECTION INTRAMUSCULAR; INTRAVENOUS PRN
Status: DISCONTINUED | OUTPATIENT
Start: 2023-05-17 | End: 2023-05-17 | Stop reason: SDUPTHER

## 2023-05-17 RX ORDER — EPHEDRINE SULFATE/0.9% NACL/PF 50 MG/5 ML
SYRINGE (ML) INTRAVENOUS PRN
Status: DISCONTINUED | OUTPATIENT
Start: 2023-05-17 | End: 2023-05-17 | Stop reason: SDUPTHER

## 2023-05-17 RX ORDER — HYDRALAZINE HYDROCHLORIDE 20 MG/ML
10 INJECTION INTRAMUSCULAR; INTRAVENOUS
Status: DISCONTINUED | OUTPATIENT
Start: 2023-05-17 | End: 2023-05-17 | Stop reason: HOSPADM

## 2023-05-17 RX ORDER — DIPHENHYDRAMINE HYDROCHLORIDE 50 MG/ML
12.5 INJECTION INTRAMUSCULAR; INTRAVENOUS
Status: DISCONTINUED | OUTPATIENT
Start: 2023-05-17 | End: 2023-05-17 | Stop reason: HOSPADM

## 2023-05-17 RX ORDER — PROPOFOL 10 MG/ML
INJECTION, EMULSION INTRAVENOUS PRN
Status: DISCONTINUED | OUTPATIENT
Start: 2023-05-17 | End: 2023-05-17 | Stop reason: SDUPTHER

## 2023-05-17 RX ORDER — DEXAMETHASONE SODIUM PHOSPHATE 10 MG/ML
INJECTION, SOLUTION INTRAMUSCULAR; INTRAVENOUS PRN
Status: DISCONTINUED | OUTPATIENT
Start: 2023-05-17 | End: 2023-05-17 | Stop reason: SDUPTHER

## 2023-05-17 RX ORDER — OXYCODONE HYDROCHLORIDE AND ACETAMINOPHEN 5; 325 MG/1; MG/1
1 TABLET ORAL
Status: DISCONTINUED | OUTPATIENT
Start: 2023-05-17 | End: 2023-05-17 | Stop reason: HOSPADM

## 2023-05-17 RX ORDER — SODIUM CHLORIDE 0.9 % (FLUSH) 0.9 %
5-40 SYRINGE (ML) INJECTION PRN
Status: DISCONTINUED | OUTPATIENT
Start: 2023-05-17 | End: 2023-05-17 | Stop reason: HOSPADM

## 2023-05-17 RX ORDER — SODIUM CHLORIDE 0.9 % (FLUSH) 0.9 %
5-40 SYRINGE (ML) INJECTION EVERY 12 HOURS SCHEDULED
Status: DISCONTINUED | OUTPATIENT
Start: 2023-05-17 | End: 2023-05-17 | Stop reason: HOSPADM

## 2023-05-17 RX ORDER — IPRATROPIUM BROMIDE AND ALBUTEROL SULFATE 2.5; .5 MG/3ML; MG/3ML
1 SOLUTION RESPIRATORY (INHALATION)
Status: DISCONTINUED | OUTPATIENT
Start: 2023-05-17 | End: 2023-05-17 | Stop reason: HOSPADM

## 2023-05-17 RX ORDER — BUPIVACAINE HYDROCHLORIDE 5 MG/ML
INJECTION, SOLUTION EPIDURAL; INTRACAUDAL
Status: COMPLETED
Start: 2023-05-17 | End: 2023-05-17

## 2023-05-17 RX ORDER — SODIUM CHLORIDE 9 MG/ML
INJECTION, SOLUTION INTRAVENOUS PRN
Status: DISCONTINUED | OUTPATIENT
Start: 2023-05-17 | End: 2023-05-17 | Stop reason: HOSPADM

## 2023-05-17 RX ORDER — FENTANYL CITRATE 50 UG/ML
INJECTION, SOLUTION INTRAMUSCULAR; INTRAVENOUS
Status: COMPLETED
Start: 2023-05-17 | End: 2023-05-17

## 2023-05-17 RX ORDER — MIDAZOLAM HYDROCHLORIDE 2 MG/2ML
2 INJECTION, SOLUTION INTRAMUSCULAR; INTRAVENOUS
Status: DISCONTINUED | OUTPATIENT
Start: 2023-05-17 | End: 2023-05-17 | Stop reason: HOSPADM

## 2023-05-17 RX ORDER — DOCUSATE SODIUM 100 MG/1
100 CAPSULE, LIQUID FILLED ORAL 2 TIMES DAILY
Qty: 60 CAPSULE | Refills: 0 | Status: SHIPPED | OUTPATIENT
Start: 2023-05-17 | End: 2023-06-16

## 2023-05-17 RX ORDER — MORPHINE SULFATE 2 MG/ML
2 INJECTION, SOLUTION INTRAMUSCULAR; INTRAVENOUS EVERY 5 MIN PRN
Status: DISCONTINUED | OUTPATIENT
Start: 2023-05-17 | End: 2023-05-17 | Stop reason: HOSPADM

## 2023-05-17 RX ORDER — KETOROLAC TROMETHAMINE 30 MG/ML
INJECTION, SOLUTION INTRAMUSCULAR; INTRAVENOUS PRN
Status: DISCONTINUED | OUTPATIENT
Start: 2023-05-17 | End: 2023-05-17 | Stop reason: SDUPTHER

## 2023-05-17 RX ORDER — EPINEPHRINE 1 MG/ML
INJECTION, SOLUTION INTRAMUSCULAR; SUBCUTANEOUS
Status: DISCONTINUED
Start: 2023-05-17 | End: 2023-05-17 | Stop reason: HOSPADM

## 2023-05-17 RX ORDER — ONDANSETRON 2 MG/ML
INJECTION INTRAMUSCULAR; INTRAVENOUS PRN
Status: DISCONTINUED | OUTPATIENT
Start: 2023-05-17 | End: 2023-05-17 | Stop reason: SDUPTHER

## 2023-05-17 RX ORDER — MIDAZOLAM HYDROCHLORIDE 1 MG/ML
INJECTION INTRAMUSCULAR; INTRAVENOUS
Status: COMPLETED
Start: 2023-05-17 | End: 2023-05-17

## 2023-05-17 RX ORDER — FENTANYL CITRATE 50 UG/ML
100 INJECTION, SOLUTION INTRAMUSCULAR; INTRAVENOUS ONCE
Status: COMPLETED | OUTPATIENT
Start: 2023-05-17 | End: 2023-05-17

## 2023-05-17 RX ORDER — GLYCOPYRROLATE 0.2 MG/ML
0.4 INJECTION INTRAMUSCULAR; INTRAVENOUS ONCE
Status: DISCONTINUED | OUTPATIENT
Start: 2023-05-17 | End: 2023-05-17 | Stop reason: HOSPADM

## 2023-05-17 RX ORDER — IBUPROFEN 800 MG/1
800 TABLET ORAL
Qty: 90 TABLET | Refills: 0 | Status: SHIPPED | OUTPATIENT
Start: 2023-05-17

## 2023-05-17 RX ORDER — LIDOCAINE HYDROCHLORIDE 10 MG/ML
1 INJECTION, SOLUTION INFILTRATION; PERINEURAL
Status: DISCONTINUED | OUTPATIENT
Start: 2023-05-17 | End: 2023-05-17 | Stop reason: HOSPADM

## 2023-05-17 RX ORDER — ONDANSETRON 2 MG/ML
4 INJECTION INTRAMUSCULAR; INTRAVENOUS
Status: DISCONTINUED | OUTPATIENT
Start: 2023-05-17 | End: 2023-05-17 | Stop reason: HOSPADM

## 2023-05-17 RX ORDER — OXYCODONE HYDROCHLORIDE AND ACETAMINOPHEN 5; 325 MG/1; MG/1
2 TABLET ORAL
Status: DISCONTINUED | OUTPATIENT
Start: 2023-05-17 | End: 2023-05-17 | Stop reason: HOSPADM

## 2023-05-17 RX ORDER — LIDOCAINE HYDROCHLORIDE 10 MG/ML
INJECTION, SOLUTION INFILTRATION; PERINEURAL PRN
Status: DISCONTINUED | OUTPATIENT
Start: 2023-05-17 | End: 2023-05-17 | Stop reason: SDUPTHER

## 2023-05-17 RX ORDER — CEFAZOLIN 2 G/1
INJECTION, POWDER, FOR SOLUTION INTRAMUSCULAR; INTRAVENOUS
Status: DISCONTINUED
Start: 2023-05-17 | End: 2023-05-17 | Stop reason: HOSPADM

## 2023-05-17 RX ORDER — ROCURONIUM BROMIDE 10 MG/ML
INJECTION, SOLUTION INTRAVENOUS PRN
Status: DISCONTINUED | OUTPATIENT
Start: 2023-05-17 | End: 2023-05-17 | Stop reason: SDUPTHER

## 2023-05-17 RX ORDER — OXYCODONE HYDROCHLORIDE AND ACETAMINOPHEN 5; 325 MG/1; MG/1
1-2 TABLET ORAL EVERY 6 HOURS PRN
Qty: 40 TABLET | Refills: 0 | Status: SHIPPED | OUTPATIENT
Start: 2023-05-17 | End: 2023-05-24

## 2023-05-17 RX ORDER — SODIUM CHLORIDE, SODIUM LACTATE, POTASSIUM CHLORIDE, CALCIUM CHLORIDE 600; 310; 30; 20 MG/100ML; MG/100ML; MG/100ML; MG/100ML
INJECTION, SOLUTION INTRAVENOUS CONTINUOUS
Status: DISCONTINUED | OUTPATIENT
Start: 2023-05-17 | End: 2023-05-17 | Stop reason: HOSPADM

## 2023-05-17 RX ORDER — BUPIVACAINE HYDROCHLORIDE 5 MG/ML
INJECTION, SOLUTION EPIDURAL; INTRACAUDAL
Status: COMPLETED | OUTPATIENT
Start: 2023-05-17 | End: 2023-05-17

## 2023-05-17 RX ADMIN — LIDOCAINE HYDROCHLORIDE 40 MG: 10 INJECTION, SOLUTION INFILTRATION; PERINEURAL at 10:46

## 2023-05-17 RX ADMIN — Medication 5 MG: at 12:20

## 2023-05-17 RX ADMIN — FENTANYL CITRATE 100 MCG: 50 INJECTION, SOLUTION INTRAMUSCULAR; INTRAVENOUS at 10:04

## 2023-05-17 RX ADMIN — DEXAMETHASONE SODIUM PHOSPHATE 10 MG: 10 INJECTION INTRAMUSCULAR; INTRAVENOUS at 10:58

## 2023-05-17 RX ADMIN — Medication 2 MG: at 13:09

## 2023-05-17 RX ADMIN — GLYCOPYRROLATE 0.4 MG: 0.2 INJECTION INTRAMUSCULAR; INTRAVENOUS at 13:09

## 2023-05-17 RX ADMIN — ONDANSETRON 4 MG: 2 INJECTION INTRAMUSCULAR; INTRAVENOUS at 13:07

## 2023-05-17 RX ADMIN — MIDAZOLAM HYDROCHLORIDE 2 MG: 1 INJECTION, SOLUTION INTRAMUSCULAR; INTRAVENOUS at 10:04

## 2023-05-17 RX ADMIN — Medication 5 MG: at 11:49

## 2023-05-17 RX ADMIN — PHENYLEPHRINE HYDROCHLORIDE 50 MCG/MIN: 10 INJECTION INTRAVENOUS at 11:05

## 2023-05-17 RX ADMIN — PHENYLEPHRINE HYDROCHLORIDE 150 MCG: 10 INJECTION INTRAVENOUS at 11:01

## 2023-05-17 RX ADMIN — Medication 5 MG: at 11:42

## 2023-05-17 RX ADMIN — SODIUM CHLORIDE, POTASSIUM CHLORIDE, SODIUM LACTATE AND CALCIUM CHLORIDE: 600; 310; 30; 20 INJECTION, SOLUTION INTRAVENOUS at 11:00

## 2023-05-17 RX ADMIN — PROPOFOL 200 MG: 10 INJECTION, EMULSION INTRAVENOUS at 10:46

## 2023-05-17 RX ADMIN — BUPIVACAINE 10 ML: 13.3 INJECTION, SUSPENSION, LIPOSOMAL INFILTRATION at 10:11

## 2023-05-17 RX ADMIN — MIDAZOLAM HYDROCHLORIDE 2 MG: 2 INJECTION, SOLUTION INTRAMUSCULAR; INTRAVENOUS at 10:04

## 2023-05-17 RX ADMIN — SODIUM CHLORIDE, POTASSIUM CHLORIDE, SODIUM LACTATE AND CALCIUM CHLORIDE: 600; 310; 30; 20 INJECTION, SOLUTION INTRAVENOUS at 09:15

## 2023-05-17 RX ADMIN — CEFAZOLIN 2000 MG: 2 INJECTION, POWDER, FOR SOLUTION INTRAMUSCULAR; INTRAVENOUS at 10:56

## 2023-05-17 RX ADMIN — ROCURONIUM BROMIDE 50 MG: 10 INJECTION, SOLUTION INTRAVENOUS at 10:46

## 2023-05-17 RX ADMIN — Medication 10 MG: at 11:24

## 2023-05-17 RX ADMIN — KETOROLAC TROMETHAMINE 15 MG: 30 INJECTION, SOLUTION INTRAMUSCULAR; INTRAVENOUS at 13:07

## 2023-05-17 RX ADMIN — BUPIVACAINE HYDROCHLORIDE 10 ML: 5 INJECTION, SOLUTION EPIDURAL; INTRACAUDAL; PERINEURAL at 10:11

## 2023-05-17 RX ADMIN — PHENYLEPHRINE HYDROCHLORIDE 100 MCG: 10 INJECTION INTRAVENOUS at 11:05

## 2023-05-17 ASSESSMENT — PAIN - FUNCTIONAL ASSESSMENT: PAIN_FUNCTIONAL_ASSESSMENT: 0-10

## 2023-05-17 NOTE — ANESTHESIA PRE PROCEDURE
Department of Anesthesiology  Preprocedure Note       Name:  Jennifer Barnard   Age:  78 y.o.  :  1945                                          MRN:  [id number]         Date:  2023      Surgeon: Tyler Tang):  Sugar Roberto MD    Procedure: Procedure(s):  RIGHT SHOULDER ARTHROSCOPIC ROTATOR CUFF REPAIR WITH SUBACROMIAL DECOMPRESSION AND DISTAL CLAVICLE EXCISION    Medications prior to admission:   Prior to Admission medications    Medication Sig Start Date End Date Taking? Authorizing Provider   alfuzosin (UROXATRAL) 10 MG extended release tablet Take 1 tablet by mouth daily 23   Swati Montana MD       Current medications:    Current Facility-Administered Medications   Medication Dose Route Frequency Provider Last Rate Last Admin    lidocaine 1 % injection 1 mL  1 mL IntraDERmal Once PRN Mariluz Schultz MD        lactated ringers IV soln infusion   IntraVENous Continuous Mariluz Schultz MD        sodium chloride flush 0.9 % injection 5-40 mL  5-40 mL IntraVENous 2 times per day Mariluz Schultz MD        sodium chloride flush 0.9 % injection 5-40 mL  5-40 mL IntraVENous PRN Mariluz Schultz MD        0.9 % sodium chloride infusion   IntraVENous PRN Mariluz Schultz MD        bupivacaine (PF) (MARCAINE) 0.5 % injection             ceFAZolin (ANCEF) 2 g injection             fentaNYL (SUBLIMAZE) 100 MCG/2ML injection             midazolam (VERSED) 2 MG/2ML injection             bupivacaine liposome (EXPAREL) 1.3 % injection                Allergies:     Allergies   Allergen Reactions    No Known Allergies        Problem List:    Patient Active Problem List   Diagnosis Code    Cervical spondylosis with myelopathy M47.12    Ganglion cyst M67.40    Lumbar disc herniation M51.26    BPH with obstruction/lower urinary tract symptoms N40.1, N13.8    Elevated PSA R97.20    Microhematuria R31.29    Nocturia R35.1    Bilateral renal stones N20.0       Past Medical History:

## 2023-05-17 NOTE — ANESTHESIA POSTPROCEDURE EVALUATION
POST- ANESTHESIA EVALUATION       Pt Name: Dayron Mann  MRN: [id number]  YOB: 1945  Date of evaluation: 5/17/2023  Time:  2:28 PM      /70   Pulse 90   Temp 97.7 °F (36.5 °C) (Infrared)   Resp 13   Ht 5' 9\" (1.753 m)   Wt 123 lb (55.8 kg)   SpO2 99%   BMI 18.16 kg/m²      Consciousness Level  Awake  Cardiopulmonary Status  Stable  Pain Adequately Treated YES  Nausea / Vomiting  NO  Adequate Hydration  YES  Anesthesia Related Complications NONE      Electronically signed by Alexia Hurst MD on 5/17/2023 at 2:28 PM       Department of Anesthesiology  Postprocedure Note    Patient: Dayron Mann  MRN: [id number]  YOB: 1945  Date of evaluation: 5/17/2023      Procedure Summary     Date: 05/17/23 Room / Location: [address]    Anesthesia Start: 2889 Anesthesia Stop: 1330    Procedure: RIGHT SHOULDER ARTHROSCOPIC ROTATOR CUFF REPAIR WITH SUBACROMIAL DECOMPRESSION AND DISTAL CLAVICLE EXCISION (Right: Shoulder) Diagnosis:       Tear of right rotator cuff, unspecified tear extent, unspecified whether traumatic      Osteoarthritis of AC (acromioclavicular) joint      Subacromial impingement of right shoulder      (Tear of right rotator cuff, unspecified tear extent, unspecified whether traumatic [M75.101])      (Osteoarthritis of AC (acromioclavicular) joint [M19.019])      (Subacromial impingement of right shoulder [M75.41])    Surgeons: Zuleima Benítez MD Responsible Provider: Alexia Hurst MD    Anesthesia Type: general, regional ASA Status: 3          Anesthesia Type: No value filed.     Marian Phase I: Marian Score: 10    Marian Phase II: Marian Score: 10      Anesthesia Post Evaluation

## 2023-05-17 NOTE — ANESTHESIA PROCEDURE NOTES
Peripheral Block    Patient location during procedure: pre-op  Reason for block: post-op pain management and at surgeon's request  Start time: 5/17/2023 10:08 AM  End time: 5/17/2023 10:11 AM  Staffing  Performed: anesthesiologist   Anesthesiologist: Wade Villatoro MD  Preanesthetic Checklist  Completed: patient identified, IV checked, site marked, risks and benefits discussed, surgical/procedural consents, equipment checked, pre-op evaluation, timeout performed, anesthesia consent given, oxygen available, monitors applied/VS acknowledged, fire risk safety assessment completed and verbalized and blood product R/B/A discussed and consented  Peripheral Block   Patient position: sitting  Prep: ChloraPrep  Provider prep: mask and sterile gloves  Patient monitoring: cardiac monitor, continuous pulse ox and frequent blood pressure checks  Block type: Brachial plexus  Interscalene  Laterality: right  Injection technique: single-shot  Guidance: nerve stimulator and ultrasound guided    Needle   Needle type: insulated echogenic nerve stimulator needle   Needle gauge: 22 G  Needle localization: anatomical landmarks, nerve stimulator and ultrasound guidance  Needle length: 2 IN.   Assessment   Injection assessment: negative aspiration for heme, no paresthesia on injection, local visualized surrounding nerve on ultrasound and no intravascular symptoms  Paresthesia pain: none  Slow fractionated injection: yes  Hemodynamics: stableno  Outcomes: patient tolerated procedure well    Additional Notes  (+) RIGHT DELTOID TWITCH FROM 1.5MA DOWN TO 0.7MA  Medications Administered  bupivacaine (MARCAINE) PF injection 0.5% - Perineural   10 mL - 5/17/2023 10:11:00 AM  bupivacaine liposome (EXPAREL) injection 1.3% - Perineural   10 mL - 5/17/2023 10:11:00 AM

## 2023-05-17 NOTE — DISCHARGE INSTRUCTIONS
Surgical bandage may be removed in 2 days down to the bare skin. You may shower at that time. Then place a dry bandage or bandaid over the incisions daily. Keep the sling on or keep the elbow at the side at all times if the sling is taken off. Wear the sling while you sleep. No raising the arm overhead or out away from the body. No lifting with the operative arm. Activity  You have had anesthesia today  Do not drive, operate heavy equipment, consume alcoholic beverages, or make any important decisions  for 24 hours   If you are taking pain medication: Do not drive or consume alcohol. Take your time changing positions today. You may feel light headed or dizzy if you move too quickly. Continue your home medications as ordered by your physician. Diet   You can eat your normal diet when you feel well. You should start off with bland foods like chicken soup, toast, or yogurt. Then advance as tolerated. Drink plenty of fluids (unless your doctor tells you not to). Your urine should be very lightly colored without a strong odor.

## 2023-05-17 NOTE — OP NOTE
Operative Note      Patient: William Sanches  YOB: 1945  MRN: [id number]    Date of Procedure: 5/17/2023    Pre-Op Diagnosis Codes:     * Tear of right rotator cuff, unspecified tear extent, unspecified whether traumatic [M75.101]     * Osteoarthritis of AC (acromioclavicular) joint [M19.019]     * Subacromial impingement of right shoulder [M75.41]    Post-Op Diagnosis: Same       Procedure(s):  RIGHT SHOULDER ARTHROSCOPIC ROTATOR CUFF REPAIR WITH SUBACROMIAL DECOMPRESSION AND DISTAL CLAVICLE EXCISION    Surgeon(s):  Brendan Bailey MD    Assistant:   First Assistant: Nelida Thompson RN    Anesthesia: General    Estimated Blood Loss (mL): Minimal    Complications: None    Specimens:   * No specimens in log *    Implants:  Implant Name Type Inv. Item Serial No.  Lot No. LRB No. Used Action   [facility] BONE SP FBRTAK RC TGRTPE MELODIE  - [id number]  [facility] BONE SP FBRTAK RC TGRTPE MELODIE   [address] [employer] [id number] Right 1 Implanted   ANCHOR BONE SP FBRTAK RC TGRTPE WH/BLK  - [id number]  ANCHOR BONE SP FBRTAK RC TGRTPE WH/BLK   [employer] [id number] Right 1 Implanted   ANCHOR BIOCOMPOSITE KNOTLESS SL  4.75X19.1MM W/#2 BL SUTURE - [id number]  ANCHOR BIOCOMPOSITE KNOTLESS SL  4.75X19.1MM W/#2 BL SUTURE  [employer] [id number] Right 1 Implanted   ANCHOR SUT L19.1MM DIA6. 25MM CLS EYELET TENODESIS FOR PROX - [id number]  ANCHOR SUT L19.1MM DIA6. 25MM CLS EYELET TENODESIS FOR PROX  [employer] [id number] Right 1 Implanted         Drains: * No LDAs found *    Findings: See below      Detailed Description of Procedure:   Informed consent was obtained in the office. Marked his right shoulder in the preoperative holding area. He received a regional nerve block. He was brought back to the operating room where general anesthesia was smoothly induced. He was placed in the beachchair position. The right arm was prepped and draped in normal sterile fashion. The timeout was performed.   He did receive prophylactic

## 2023-05-17 NOTE — H&P
chills    Physical Exam  /71   Pulse 78   Temp 98 °F (36.7 °C) (Infrared)   Resp 13   Ht 5' 9\" (1.753 m)   Wt 123 lb (55.8 kg)   SpO2 99%   BMI 18.16 kg/m²    General Appearance: in no distress  HEENT: Normocephalic  CV: brisk cap refill to extremities  Lungs: Nonlabored breathing  Abdomen: soft  Extremities: The right shoulder skin is intact    Assessment and Plan  William Sanches is a 78 y.o. old male with a right shoulder rotator cuff tear, symptomatic AC joint osteoarthritis, subacromial impingement. We plan on proceeding with a right shoulder arthroscopy with rotator cuff repair, distal clavicle excision, subacromial decompression. This note is created with the assistance of a speech recognition program.  While intending to generate a document that actually reflects the content of the visit, the document can still have some errors including those of syntax and sound a like substitutions which may escape proof reading. It such instances, actual meaning can be extrapolated by contextual diversion.

## 2023-06-05 ENCOUNTER — PROCEDURE VISIT (OUTPATIENT)
Age: 78
End: 2023-06-05
Payer: MEDICARE

## 2023-06-05 VITALS — BODY MASS INDEX: 18.22 KG/M2 | HEIGHT: 69 IN | WEIGHT: 123 LBS

## 2023-06-05 DIAGNOSIS — R97.20 ELEVATED PSA: Primary | ICD-10-CM

## 2023-06-05 DIAGNOSIS — N20.0 BILATERAL RENAL STONES: ICD-10-CM

## 2023-06-05 DIAGNOSIS — N40.1 BPH WITH OBSTRUCTION/LOWER URINARY TRACT SYMPTOMS: ICD-10-CM

## 2023-06-05 DIAGNOSIS — N13.8 BPH WITH OBSTRUCTION/LOWER URINARY TRACT SYMPTOMS: ICD-10-CM

## 2023-06-05 DIAGNOSIS — R35.1 NOCTURIA: ICD-10-CM

## 2023-06-05 DIAGNOSIS — R31.29 MICROHEMATURIA: ICD-10-CM

## 2023-06-05 LAB — POST VOID RESIDUAL (PVR): 38.45 ML

## 2023-06-05 PROCEDURE — 51798 US URINE CAPACITY MEASURE: CPT | Performed by: SPECIALIST

## 2023-06-05 PROCEDURE — 52000 CYSTOURETHROSCOPY: CPT | Performed by: SPECIALIST

## 2023-06-05 PROCEDURE — 76872 US TRANSRECTAL: CPT | Performed by: SPECIALIST

## 2023-06-05 PROCEDURE — 51741 ELECTRO-UROFLOWMETRY FIRST: CPT | Performed by: SPECIALIST

## 2023-06-05 PROCEDURE — 99214 OFFICE O/P EST MOD 30 MIN: CPT | Performed by: SPECIALIST

## 2023-06-05 PROCEDURE — 1123F ACP DISCUSS/DSCN MKR DOCD: CPT | Performed by: SPECIALIST

## 2023-06-05 RX ORDER — CEPHALEXIN 500 MG/1
500 CAPSULE ORAL 2 TIMES DAILY
Qty: 2 CAPSULE | Refills: 0 | Status: SHIPPED | OUTPATIENT
Start: 2023-06-05

## 2023-06-05 NOTE — PROGRESS NOTES
[address], [address] [facility] Urology Office Progress Note    Patient:  Alton Ham  YOB: 1945  Date: 6/5/2023    HISTORY OF PRESENT ILLNESS:   The patient is a 78 y.o. male  Patient's lower urinary tract symptoms are improved on Alfuzosin 10 mg po qd for BPH symptoms. Will increase to Alfuzosin 10 mg po bid for BPH symptoms   Patient's voiding diary shows a maximal bladder capacity of 300 mL (normal is 400 mL) and daily urine outputs of 2010 mL a day (normal is 1500 mL/d). Patient instructed to limit fluid intake 2-3 hours prior to bedtime to minimize nocturia or nocturnal incontinence. Patient will need a repeat PSA in mid-July 2023 to see if his previous Elevated PSA decreases with Rx of his E coli UTI.  (This is a separate and significant E/M service and discussion performed on the same day of a minor procedure justifying a -25 modifier; separate issues discussed include BPH, nocturia, Elevated PSA). Lower urinary tract symptoms: frequency, hesitancy, decreased urinary stream, and nocturia, 3 times per night   Last AUA Symptom Score (QOL): 19 (6)  Today's AUA Symptom Score (QOL): 10 (4)    Summary of old records:   Elevated PSA of 10.43 on 4/7/23; 3/27/23 E coli UTI; Rx 4 weeks of Bactrim; repeat PSA in 3 months  3/27/23 E coli UTI; PVR = 0 mL  Very large prostate, BPH on 4/18/23 CT: begin Alfuzosin 10 mg po qd 4/20/23, bid 6/5/23 5/11/23 VD: ZGC=856 mL, BCN=9214 mL/d, TV=11.67 voids/d, VGS=390 mL/void, Nx4.67, NPi=52%, NUP=97 mL/hr   Punctate bilateral kidney stones on 4/18/23 CT  Small microhematuria on 4/20/23 UA    Additional History: none    Procedures Today:   Cystoscopy Operative Note (6/5/23):  Surgeon: Kandi Silva. Pancho Coleman MD, FACS  Anesthesia: Urethral 2% Xylocaine   Indications: lower urinary tract symptoms   Position: Dorsal Lithotomy    Findings:   The patient was prepped and draped in the usual sterile fashion.   The flexible cystoscope was

## 2023-07-06 ENCOUNTER — HOSPITAL ENCOUNTER (OUTPATIENT)
Dept: PHYSICAL THERAPY | Age: 78
Setting detail: THERAPIES SERIES
Discharge: HOME | End: 2023-07-06
Payer: MEDICARE

## 2023-07-06 PROCEDURE — 97161 PT EVAL LOW COMPLEX 20 MIN: CPT

## 2023-07-06 PROCEDURE — 97110 THERAPEUTIC EXERCISES: CPT

## 2023-07-06 NOTE — FLOWSHEET NOTE
per clinical history; Hearing Loss (chuy. In R ear)    Exercises:  Exercise Reps/ Time Weight/ Level Comments   Seated AAROM IR/ER with wand 2 x 10 reps   ER stretch held for 5    Standing AAROM (R) Abduction with wand  2 x 10 reps      Standing AAROM (R) Scaption with towel on wall  5 reps   Regressed to gravity neutral as caused too much pt discomfort    Side-lying (R) Flexion  10 reps            Other:    Specific Instructions for next treatment: Progress ROM as tolerated. Implement  strengthening. Treatment Charges: Mins Units   [x] Evaluation       [x]  Low       []  Moderate       []  High 45 1   []  Modalities     [x]  Ther Exercise 15 1   []  Manual Therapy     []  Ther Activities     []  Aquatics     []  Neuromuscular     []  Gait Training     []  Dry Needling           1-2 muscles     []  Dry Needling           3 or more          muscles     [] Vasocompression     []  Other     Total  60 2        TOTAL TREATMENT TIME: 60    Time in:1100   Time Out:1200    Electronically signed by: ALEJANDRO Gage        Physician Signature:________________________________Date:__________________  By signing above or cosigning this note, I have reviewed this plan of care and certify a need for medically necessary rehabilitation services.

## 2023-07-10 ENCOUNTER — OFFICE VISIT (OUTPATIENT)
Dept: NEUROSURGERY | Age: 78
End: 2023-07-10
Payer: MEDICARE

## 2023-07-10 VITALS
TEMPERATURE: 98.1 F | WEIGHT: 127 LBS | OXYGEN SATURATION: 94 % | DIASTOLIC BLOOD PRESSURE: 81 MMHG | HEART RATE: 96 BPM | BODY MASS INDEX: 19.25 KG/M2 | HEIGHT: 68 IN | SYSTOLIC BLOOD PRESSURE: 126 MMHG

## 2023-07-10 DIAGNOSIS — S12.191D OTHER CLOSED NONDISPLACED FRACTURE OF SECOND CERVICAL VERTEBRA WITH ROUTINE HEALING, SUBSEQUENT ENCOUNTER: Primary | ICD-10-CM

## 2023-07-10 DIAGNOSIS — Z98.1 HISTORY OF FUSION OF CERVICAL SPINE: ICD-10-CM

## 2023-07-10 DIAGNOSIS — M48.02 CERVICAL STENOSIS OF SPINE: ICD-10-CM

## 2023-07-10 PROCEDURE — 1123F ACP DISCUSS/DSCN MKR DOCD: CPT | Performed by: NURSE PRACTITIONER

## 2023-07-10 PROCEDURE — 99204 OFFICE O/P NEW MOD 45 MIN: CPT | Performed by: NURSE PRACTITIONER

## 2023-07-10 RX ORDER — BISACODYL 5 MG/1
5 TABLET, DELAYED RELEASE ORAL DAILY PRN
COMMUNITY

## 2023-07-10 NOTE — PROGRESS NOTES
[address] [place] [address] S. Darlene  [facility] [address] [place] [address], [address] [place] [address] Road [address]  Dept: [phone]    Patient:  Santana Solis  YOB: 1945  Date: 7/10/23    The patient is a 78 y.o. male who presents today for consult of the following problems:     Chief Complaint   Patient presents with    Neck Pain         HPI:     Santana Solis is a 78 y.o. male on whom neurosurgical consultation was requested by VINICIO Maxwell CNP for management of C2 fracture and spinal stenosis. Patient sustained a fall in March down the stairs in his home, was found to have acute C2 fracture, as well as right shoulder injury. Cervical fracture treated nonsurgically, found to be stable. Patient does have history significant for C3-C5 anterior fusion in 2014. Denies any current neck pain. Does feel some mild decreased to range of motion. Ultimately diagnosed with right rotator cuff tear, underwent surgery. Recently started physical therapy to work on shoulder, arm as well as cervical range of motion last week. Initially was having some diminished sensation to feet when walking in socks or slippers, feels that this has improved. Some stiffness to right hand when he first wakes up in the morning, improves after he mobilizes, does report having decreased mobility to right arm since injury from shoulder injury. No left hand symptoms. Denies any current gait instability. Fall believed to be precipitated by unsteadiness secondary to UTI. This has been treated, no longer having issues. Did complete flexion-extension x-rays approximately 2 months ago for further monitoring of cervical fracture, these were stable with no evidence of dynamic instability. Patient denies any distinct hand weakness, dexterity issues, hand clumsiness. Feels as though he has returned essentially to his baseline.     History:     Past

## 2023-07-11 ENCOUNTER — HOSPITAL ENCOUNTER (OUTPATIENT)
Dept: PHYSICAL THERAPY | Age: 78
Setting detail: THERAPIES SERIES
Discharge: HOME | End: 2023-07-11
Payer: MEDICARE

## 2023-07-11 PROCEDURE — 97110 THERAPEUTIC EXERCISES: CPT

## 2023-07-11 NOTE — FLOWSHEET NOTE
function and facilitate return to recreational activities such as gardening. Patient will demonstrate knowledge of fall risk prevention for safety in the home and community. Patient goals: Improve (R) shoulder motion and strength; Return to gardening and target shooting      Functional Assessment Used: Quick DASH  Current Status: 60% disability (33/50)  Goal Status: </= 30% disability      Pt. Education:  [] Yes  [] No  [x] Reviewed Prior HEP/Ed  Method of Education: [x] Verbal  [] Demo  [] Written  HEP given at al date 7/6:  Access Code: [email or web address]  URL: [employer]/  Date: 07/06/2023  Prepared by: Kimberly Rodriguez     Exercises  - Standing Shoulder External Rotation AAROM with Dowel  - 2 x daily - 7 x weekly - 2 sets - 10 reps - 5 sec hold  - Standing Shoulder Abduction AAROM with Dowel  - 2 x daily - 7 x weekly - 2 sets - 10 reps - 5 sec hold  - Sidelying Shoulder Flexion 15 Degrees  - 2 x daily - 7 x weekly - 3 sets - 10 reps - 5 sec hold  - Seated Shoulder Flexion Towel Slide at Table Top  - 2 x daily - 7 x weekly - 3 sets - 10 reps - 5 sec hold  Comprehension of Education:  [x] Verbalizes understanding. [] Demonstrates understanding. [] Needs review. [] Demonstrates/verbalizes HEP/Ed previously given. Plan: [] Continue per plan of care. [] Other:      Treatment Charges: Mins Units   []  Modalities     [x]  Ther Exercise 30  2   []  Manual Therapy     []  Ther Activities     []  Aquatics     []  Neuromuscular     [] Vasocompression     [] Gait Training     [] Dry needling        [] 1 or 2 muscles        [] 3 or more muscles     []  Other     Total Treatment time 30  2     Time In: 0845            Time Out: 1087    Electronically signed by:   ALEJANDRO Pichardo

## 2023-07-13 ENCOUNTER — HOSPITAL ENCOUNTER (OUTPATIENT)
Dept: PHYSICAL THERAPY | Age: 78
Setting detail: THERAPIES SERIES
Discharge: HOME | End: 2023-07-13
Payer: MEDICARE

## 2023-07-13 PROCEDURE — 97110 THERAPEUTIC EXERCISES: CPT

## 2023-07-13 NOTE — FLOWSHEET NOTE
[] [facility]) - [facility] & Therapy  [address]  [place]: [phone]   F: [phone]    Physical Therapy Daily Treatment Note      Date:  2023  Patient Name:  Ky Fuentes    :  1945  MRN: [id number]  Physician:  Luis Ruiz MD    Insurance: [employer] Medicare Advantage  Diagnosis: Tear of right rotator cuff, unspecified tear extent, unspecified whether traumatic [M75.101]   Osteoarthritis of AC (acromioclavicular) joint [M19.019]   Subacromial impingement of right shoulder [M75.41]      Onset Date: 23     Next Dr. Rubio Marc: TBD  Visit# / total visits: 3/12  Cancels/No Shows: 0/0    Subjective:    Patient states his (R) shoulder has been feeling \"pretty good\". Patient states HEP has been going well. Patient further states mild soreness in his (R) shoulder following last treatment session that lasted < 24 hours.      Pain:  [] Yes  [x] No Location: N/A Pain Rating: (0-10 scale) 0/10  Pain altered Tx:  [x] No  [] Yes  Action:  Comments:    Objective:  Modalities:   Precautions: Protocol given by Dr. Friedman Plate - 7 weeks post op as of 2023   Exercises:  Exercise Reps/ Time Weight/ Level Comments   Supine AAROM (R) Shoulder Flexion  3 x 10 reps   Assisted with CL arm    Supine AAROM (R) Shoulder ER 2 x 15 reps   Assisted with clinician   At 0 degrees of abduction    Standing AAROM (R) Shoulder IR/ER with wand  2 x 10 reps  Assisted with dowel annie   Holding at end range ER for 5 seconds    Standing AAROM (R) Shoulder Abduction with wand  2 x 10 reps   Assisted with dowel annie  Holding at end range abduction for 5 seconds    Standing Pec Major stretch at 45 degrees of abduction  3 x 30 seconds  In doorway   Added to HEP    Seated pulley scaption  2  min       Standing ER isometric against doorway  3 sets x 10 reps x 3-5 second hold  Submax    Seated AAROM (R) ER with slide board  2 sets x 10 reps   Following isometric ER   Seated AAROM (R) Flexion with

## 2023-07-17 ENCOUNTER — HOSPITAL ENCOUNTER (OUTPATIENT)
Dept: PHYSICAL THERAPY | Age: 78
Setting detail: THERAPIES SERIES
Discharge: HOME | End: 2023-07-17
Payer: MEDICARE

## 2023-07-17 PROCEDURE — 97110 THERAPEUTIC EXERCISES: CPT

## 2023-07-17 NOTE — FLOWSHEET NOTE
[x] [facility]) [facility] & Therapy  [address]  [place]: [phone]   F: [phone]    Physical Therapy Daily Treatment Note    Date:  2023  Patient: Jennifer Bourgeois  : 1945  MRN: [id number]  Physician: Ajay Sneed MD    Medical Diagnosis: Tear of right rotator cuff, unspecified tear extent, unspecified whether traumatic [M75.101]   Osteoarthritis of AC (acromioclavicular) joint [M19.019]   Subacromial impingement of right shoulder [M75.41]       Rehab Codes: Onset date: 2023 - surgery date  Next Dr's appt.: TBD  PT Visit Information  PT Insurance Information: Medical [place] Medicare Advantage  Total # of Visits Approved: 12   Total # of Visits to Date: 4  No Show: 0  Canceled Appointment: 0    Subjective  Patient stated that he continues to remain relatively pain free within his (R) shoulder since his last treatment session. Pain:  [] Yes   [x] No      Location:  Pain Rating:   Worst:   Best:   Descriptors: Other:     Objective  Modalities:   Precautions: Protocol given by Dr. Archibald Boys -  8 weeks post op as of 2023   Exercises:  Exercise Reps/ Time Weight/ Level Comments   Supine Elevation with Wand  5 reps x 4 sets   One hand  out with each set                           Passive Stretching   Supine (R) shoulder flexion stretch 30 sec hold  5 reps   Supine (R) shoulder abduction stretch 30 sec hold x 5 reps   Supine (R) shoulder ER @ 45 stretch 30 sec x 5 reps  Supine (R) shoulder posterior capsule stretch 30 sec hold x 5 reps   Supine (R) shoulder IR @ 45 stretch 30 sec hold x 5 reps   Supine (R) upper trap stretch 30 sec hold x 5 reps     Specific Instructions for next treatment: Continue to progress per Dr. Dewayne Schwarz protocol    Pt. Education:  [] Yes  [x] No  [] Reviewed Prior HEP/Ed  Method of Education: [] Verbal  [] Demo  [] Written  HEP:   Comprehension of Education:  [] Verbalizes understanding. [] Demonstrates understanding.   []

## 2023-07-20 ENCOUNTER — HOSPITAL ENCOUNTER (OUTPATIENT)
Dept: PHYSICAL THERAPY | Age: 78
Setting detail: THERAPIES SERIES
Discharge: HOME | End: 2023-07-20
Payer: MEDICARE

## 2023-07-20 PROCEDURE — 97110 THERAPEUTIC EXERCISES: CPT

## 2023-07-20 NOTE — FLOWSHEET NOTE
[x] [facility]) [facility] & Therapy  [address]  [place]: [phone]   F: [phone]    Physical Therapy Daily Treatment Note    Date:  2023  Patient: Carolyn Martinez  : 1945  MRN: [id number]  Physician: Isabelle Lazo MD    Medical Diagnosis: Tear of right rotator cuff, unspecified tear extent, unspecified whether traumatic [M75.101]   Osteoarthritis of AC (acromioclavicular) joint [M19.019]   Subacromial impingement of right shoulder [M75.41]       Rehab Codes: Onset date: 2023 - surgery date  Next Dr's appt.: TBD  PT Visit Information  PT Insurance Information: Medical [place] Medicare Advantage  Total # of Visits Approved: 12   Total # of Visits to Date: 5  No Show: 0  Canceled Appointment: 0    Subjective  Patient stated that he continues to remain relatively pain free within his (R) shoulder since his last treatment session. Pain:  [] Yes   [x] No      Location:  Pain Rating:   Worst:   Best:   Descriptors: Other:     Objective  Modalities:   Precautions: Protocol given by Dr. Bruce Briceño -  9 weeks post op as of 2023   Exercises:  Exercise Reps/ Time Weight/ Level Comments   Supine Elevation with Wand  5 reps x 4 sets   One hand  out with each set                           Passive Stretching   Supine (R) shoulder flexion stretch 30 sec hold  5 reps   Supine (R) shoulder abduction stretch 30 sec hold x 5 reps   Supine (R) shoulder ER @ 45 stretch 30 sec x 5 reps  Supine (R) shoulder posterior capsule stretch 30 sec hold x 5 reps   Supine (R) shoulder IR @ 45 stretch 30 sec hold x 5 reps   Supine (R) upper trap stretch 30 sec hold x 5 reps     Specific Instructions for next treatment: Continue to progress per Dr. Sara Soto protocol    Pt. Education:  [] Yes  [x] No  [] Reviewed Prior HEP/Ed  Method of Education: [] Verbal  [] Demo  [] Written  HEP:   Comprehension of Education:  [] Verbalizes understanding. [] Demonstrates understanding.   []

## 2023-07-24 ENCOUNTER — HOSPITAL ENCOUNTER (OUTPATIENT)
Dept: PHYSICAL THERAPY | Age: 78
Setting detail: THERAPIES SERIES
Discharge: HOME | End: 2023-07-24
Payer: MEDICARE

## 2023-07-24 PROCEDURE — 97110 THERAPEUTIC EXERCISES: CPT

## 2023-07-27 ENCOUNTER — HOSPITAL ENCOUNTER (OUTPATIENT)
Dept: PHYSICAL THERAPY | Age: 78
Setting detail: THERAPIES SERIES
Discharge: HOME | End: 2023-07-27
Payer: MEDICARE

## 2023-07-27 ENCOUNTER — HOSPITAL ENCOUNTER (OUTPATIENT)
Age: 78
Setting detail: SPECIMEN
Discharge: HOME | End: 2023-07-27

## 2023-07-27 DIAGNOSIS — R97.20 ELEVATED PSA: ICD-10-CM

## 2023-07-27 LAB — PSA SERPL-MCNC: 4.86 NG/ML

## 2023-07-27 PROCEDURE — 97110 THERAPEUTIC EXERCISES: CPT

## 2023-07-27 NOTE — FLOWSHEET NOTE
[x] [facility]) [facility] & Therapy  [address]  [place]: [phone]   F: [phone]    Physical Therapy Progress Note Update    Date:  2023  Patient: Carolyn Martinez  : 1945  MRN: [id number]  Physician: Isabelle Lazo MD     Insurance: [employer] Medicare Advantage  Medical Diagnosis:   Tear of right rotator cuff, unspecified tear extent, unspecified whether traumatic [M75.101]   Osteoarthritis of AC (acromioclavicular) joint [M19.019]   Subacromial impingement of right shoulder [M75.41]    Rehab Codes: (R) shoulder pain (M25.511) with limited motion (M25.611)  Onset Date: 2023    Next 's appt.: TBD  Visit Count:    Cancel/No Show: 0/0    Subjective:   Patient stated that he felt he is making progress with his treatment sessions. He has been using his (R) UE with his ADLs with little to no pain/difficulty. However, still unable to perform ADLs above shoulder level. Pain  Pain:  [] Yes   [x] No      Location:   Pain Rating: (0-10 scale)   Worst:   Best:   Symptoms:  [x] Improving [] Worsening       [] Same  Descriptors:   Aggravating factors:   Relieving factors:   Sleep: Not disturbed  Other:     Objective  Observation/Palpation    Upper Extremity - Passive ROM   Motion  Right  Left  Comments    Shoulder Flexion  0-100 passively WNL    Shoulder Abduction  0-80 passively  WNL    Shoulder Extension  WNL WNL    Shoulder IR  WNL at 45 degrees abd WNL    Shoulder ER 0- 40 degrees at 45 degrees abd WNL    Elbow Extension  WNL WNL    Elbow Flexion  WNL WNL    Pronation  WNL WNL    Supination  WNL WNL    Wrist Flexion  WNL WNL    Wrist Extension  WNL WNL      Assessment  Patient is improving as evidence by him self-reporting a reduction in his pain levels. Relatively pain free within the (R) shoulder. Passive motion(s) within all planes of the (R) shoulder showed an increase compared to the evaluation.  However, all of the motions are limited

## 2023-07-31 ENCOUNTER — OFFICE VISIT (OUTPATIENT)
Age: 78
End: 2023-07-31
Payer: MEDICARE

## 2023-07-31 VITALS — WEIGHT: 127 LBS | HEIGHT: 68 IN | BODY MASS INDEX: 19.25 KG/M2

## 2023-07-31 DIAGNOSIS — N13.8 BPH WITH OBSTRUCTION/LOWER URINARY TRACT SYMPTOMS: ICD-10-CM

## 2023-07-31 DIAGNOSIS — R31.29 MICROHEMATURIA: ICD-10-CM

## 2023-07-31 DIAGNOSIS — R97.20 ELEVATED PSA: Primary | ICD-10-CM

## 2023-07-31 DIAGNOSIS — N40.1 BPH WITH OBSTRUCTION/LOWER URINARY TRACT SYMPTOMS: ICD-10-CM

## 2023-07-31 DIAGNOSIS — R35.1 NOCTURIA: ICD-10-CM

## 2023-07-31 DIAGNOSIS — N20.0 BILATERAL RENAL STONES: ICD-10-CM

## 2023-07-31 LAB
BILIRUBIN, POC: NORMAL
BLOOD URINE, POC: NORMAL
CLARITY, POC: CLEAR
COLOR, POC: YELLOW
GLUCOSE URINE, POC: NORMAL
KETONES, POC: NORMAL
LEUKOCYTE EST, POC: NORMAL
NITRITE, POC: NORMAL
PH, POC: NORMAL
PROTEIN, POC: NORMAL
SPECIFIC GRAVITY, POC: NORMAL
UROBILINOGEN, POC: NORMAL

## 2023-07-31 PROCEDURE — 1123F ACP DISCUSS/DSCN MKR DOCD: CPT | Performed by: SPECIALIST

## 2023-07-31 PROCEDURE — 81003 URINALYSIS AUTO W/O SCOPE: CPT | Performed by: SPECIALIST

## 2023-07-31 PROCEDURE — 99214 OFFICE O/P EST MOD 30 MIN: CPT | Performed by: SPECIALIST

## 2023-08-01 ENCOUNTER — HOSPITAL ENCOUNTER (OUTPATIENT)
Dept: PHYSICAL THERAPY | Age: 78
Setting detail: THERAPIES SERIES
Discharge: HOME | End: 2023-08-01
Payer: MEDICARE

## 2023-08-01 PROCEDURE — 97110 THERAPEUTIC EXERCISES: CPT

## 2023-08-01 NOTE — FLOWSHEET NOTE
[x] [facility]) - [facility] & Therapy  [address]  [place]: [phone]   F: [phone]    Physical Therapy Daily Treatment Note    Date:  23  Patient: Carolyn Martinez  : 1945  MRN: [id number]  Physician: Isabelle Lazo MD    Medical Diagnosis: Tear of right rotator cuff, unspecified tear extent, unspecified whether traumatic [M75.101]   Osteoarthritis of AC (acromioclavicular) joint [M19.019]   Subacromial impingement of right shoulder [M75.41]       Rehab Codes: Onset date: 2023 - surgery date    Next Dr's appt.: 8/15/23    PT Visit Information  PT Insurance Information: [employer] Medicare Advantage  Total # of Visits Approved: 12   Total # of Visits to Date: 8  No Show: 0  Canceled Appointment: 0    Subjective  Reports he has improved a lot since surgery- able to feed himself, able to wash left arm pit and initiate elevation of R UE. Wants to improve with ROM. Reports nothing needed for pain at this time and denies spikes in pain.      Pain:  [] Yes   [x] No      Location: right shoulder tightness   Pain Ratin/10      Objective     Precautions: Protocol given by Dr. Nikky Luz Phase III-  11 weeks post op as of 2023   Exercises:  Exercise Reps/ Time Weight/ Level Comments   Seated Pulleys Flex/Scap 2' each     Supine Elevation with Wand    One hand  out with each set   Supine Protraction 10 reps                 Side Lying (R) Shoulder ER      Side Lying (R) shoulder Flexion                  Seated (B) Scapular Retraction with ER 10 reps x 3 sec hold                       Standing IR behind back AROM 10 reps  Watch posture/rounded shoulders   Standing Wall Slides Flexion 5 reps x 5 sec hold  Towel with B UE' watch for R UT compensation   T-tube Retraction      T-Tube Shoulder Ext      T-Tube IR/ER @ Neutral            Passive Stretching   Seated R Posterior Shoulder Stretch 3 reps x 30 seconds  Supine (R) shoulder flexion stretch 30

## 2023-08-03 ENCOUNTER — HOSPITAL ENCOUNTER (OUTPATIENT)
Dept: PHYSICAL THERAPY | Age: 78
Setting detail: THERAPIES SERIES
Discharge: HOME | End: 2023-08-03
Payer: MEDICARE

## 2023-08-03 PROCEDURE — 97110 THERAPEUTIC EXERCISES: CPT

## 2023-08-03 NOTE — FLOWSHEET NOTE
with ER         Passive Stretching   Seated R Posterior Shoulder Stretch 3 reps x 30 seconds  Supine (R) shoulder flexion stretch 30 sec hold  3 reps   Supine (R) shoulder abduction stretch 30 sec hold x 3 reps   Supine (R) shoulder ER @ 45* ABD stretch 30 sec x 3 reps  Supine (R) shoulder IR @ 75* ABD stretch 30 sec hold x 3 reps       Upper Extremity - Supine Active Assistive ROM   Motion  Right    Shoulder Flexion  125*   Shoulder Abduction  91*   Shoulder IR @75*ABD 56*   Shoulder ER @ 45*ABD 43*        Specific Instructions for next treatment: Continue to progress per Dr. Dion Spicer protocol with scapular stability and improve active mobility    Pt. Education:  [] Yes  [] No  [x] Reviewed Prior HEP/Ed- reviewed avoidance of compensation at Upper trap with R shoulder elevation; importance of self stretching but also adhering to restrictions. Encouraged patient to add table slides with scaption and abduction for improved R shoulder mobility  Method of Education: [x] Verbal  [x] Demo  [] Written  HEP:   Comprehension of Education:  [x] Verbalizes understanding. [x] Demonstrates understanding. [] Needs review. [] Demonstrates/verbalizes HEP/Ed previously given. Assessment  Continued working in Phase III of surgeons protocol- patient 11 weeks post op this date. Emphasis on progressing active assistive mobility and scapular stability. Added  light resistive band for scapular retraction, IR/ER isometric resisted walking and shoulder extension with emphasis on proper technique and posture. Also began supine AAROM following passive stretching to improve R UE mobility. Overall patient tolerated well with improved ranges- most restriction noted with right shoulder ABD and ER.  Denies increased pain post session    Patient would benefit from continued physical therapy to further address the physical impairments and activity limitations that are still present    Goals  STG: (to be met in 6 treatments)- Addressed 7/27/23

## 2023-08-08 ENCOUNTER — HOSPITAL ENCOUNTER (OUTPATIENT)
Dept: PHYSICAL THERAPY | Age: 78
Setting detail: THERAPIES SERIES
Discharge: HOME | End: 2023-08-08
Payer: MEDICARE

## 2023-08-08 PROCEDURE — 97110 THERAPEUTIC EXERCISES: CPT

## 2023-08-08 NOTE — FLOWSHEET NOTE
return to target shooting. Patient will improve quick DASH score to </= 30% disability to show clinically meaningful improvement in (R) shoulder function and facilitate return to recreational activities such as gardening. Patient will demonstrate knowledge of fall risk prevention for safety in the home and community. Patient goals: Improve (R) shoulder motion and strength; Return to gardening and target shooting       Plan: [x] Continue per plan of care.    [] Other:      Treatment Charges: Mins Units   []  Modalities     [x]  Ther Exercise 45 3   []  Manual Therapy     []  Ther Activities     []  Aquatics     []  Neuromuscular     [] Vasocompression     [] Gait Training     [] Dry needling        [] 1 or 2 muscles        [] 3 or more muscles     []  Other     Total Treatment time 45 3     Time In: 3990        Time Out: 2909    Electronically signed by:  PATRICIA MarieT

## 2023-08-09 ENCOUNTER — HOSPITAL ENCOUNTER (OUTPATIENT)
Dept: CT IMAGING | Facility: CLINIC | Age: 78
Discharge: HOME | End: 2023-08-11
Payer: MEDICARE

## 2023-08-09 DIAGNOSIS — S12.191D OTHER CLOSED NONDISPLACED FRACTURE OF SECOND CERVICAL VERTEBRA WITH ROUTINE HEALING, SUBSEQUENT ENCOUNTER: ICD-10-CM

## 2023-08-09 PROCEDURE — 72125 CT NECK SPINE W/O DYE: CPT

## 2023-08-14 ENCOUNTER — HOSPITAL ENCOUNTER (OUTPATIENT)
Dept: PHYSICAL THERAPY | Age: 78
Setting detail: THERAPIES SERIES
Discharge: HOME | End: 2023-08-14
Payer: MEDICARE

## 2023-08-14 PROCEDURE — 97110 THERAPEUTIC EXERCISES: CPT

## 2023-08-14 NOTE — FLOWSHEET NOTE
[x] [facility] [place]) - [facility] & Therapy  [address]  [place]: [phone]   F: [phone]    Physical Therapy Daily Treatment Note    Date:  23  Patient: Charisse Alvarez  : 1945  MRN: [id number]  Physician: Amanda Carvalho MD    Medical Diagnosis: Tear of right rotator cuff, unspecified tear extent, unspecified whether traumatic [M75.101]   Osteoarthritis of AC (acromioclavicular) joint [M19.019]   Subacromial impingement of right shoulder [M75.41]       Rehab Codes: Onset date: 2023 - surgery date    Next Dr's appt.: 8/15/23    PT Visit Information  PT Insurance Information: [employer] Medicare Advantage  Total # of Visits Approved: 12   Total # of Visits to Date: 11  No Show: 0  Canceled Appointment: 0    Subjective  Denies any new issues- to see Dr tomorrow. Overall feels he is doing well; mild soreness after therapy.  Feels he has increased use of R UE- continues to work in his garden- able to push him self up from ground and bed    Pain:  [] Yes   [x] No      Location: right shoulder tightness   Pain Ratin/10      Objective     Precautions: Protocol given by Dr. White Anchors Phase III-  13 weeks post op as of 2023   Exercises:  Exercise Reps/ Time Weight/ Level Comments   Seated Pulleys Flex/Scap 2' each     Standing  Pulleys IR stretch behind back 3 reps x 3 sec           Supine Protraction 10 reps x 2 sets     Supine (R) Shoulder Flexion (AROM) 10 reps           Side Lying (R) Shoulder ER AROM 10 reps x 2 sets  Watch for compensation                     Standing (B) Scapular Retraction with ER 10 reps x 3 sec hold Yellow T-Band                Standing Ball on Wall Circles Cw/CCW Add  @ 90* for stabilization   Standing IR behind back AROM 15 reps  Watch posture/rounded shoulders   Standing Ball on Wall Flexion 10 reps  Yellow Ball    T-Tube Retraction 15 reps Red    T-Tube Shoulder Ext 15 reps Red    T-Tube IR/ER @ Neutral with towel roll

## 2023-08-17 ENCOUNTER — HOSPITAL ENCOUNTER (OUTPATIENT)
Dept: PHYSICAL THERAPY | Age: 78
Setting detail: THERAPIES SERIES
Discharge: HOME | End: 2023-08-17
Payer: MEDICARE

## 2023-08-17 PROCEDURE — 97110 THERAPEUTIC EXERCISES: CPT

## 2023-08-23 ENCOUNTER — HOSPITAL ENCOUNTER (OUTPATIENT)
Dept: PHYSICAL THERAPY | Age: 78
Setting detail: THERAPIES SERIES
Discharge: HOME | End: 2023-08-23
Payer: MEDICARE

## 2023-08-23 PROCEDURE — 97110 THERAPEUTIC EXERCISES: CPT

## 2023-08-23 NOTE — FLOWSHEET NOTE
[x] [facility]) [facility] & Therapy  [address]  [place]: [phone]   F: [phone]    Physical Therapy Daily Treatment Note    Date:  23  Patient: Macarena Zaldivar  : 1945  MRN: [id number]  Physician: Raiza Mcmillan MD    Medical Diagnosis: Tear of right rotator cuff, unspecified tear extent, unspecified whether traumatic [M75.101]   Osteoarthritis of AC (acromioclavicular) joint [M19.019]   Subacromial impingement of right shoulder [M75.41]       Rehab Codes: Onset date: 2023 - surgery date    Next Dr's appt.: 8/15/23    PT Visit Information  PT Insurance Information: Medical [place] Medicare Advantage  Total # of Visits Approved: 30  Total # of Visits to Date: 13  No Show: 0  Canceled Appointment: 0    New orders were received to continue physical therapy for an additional 18 visits (2 x a week). Goals were updated to reflect the new order. Subjective  Patient states that he is pain free in the (R) shoulder upon arrival. He reports some soreness after his previous session, however, denies complications with the (R) shoulder. Patient mentions that some motions increase his pain to 3/10 otherwise he is pain free. Overall, patient feels that he is improving, but states that shoulder ROM needs improvement.      Pain:  [] Yes   [x] No      Location: right shoulder tightness   Pain Ratin/10    Objective  Modalities:  Precautions: Protocol given by Dr. Gely Richardson Phase IV - 14 weeks post op as of 2023   Exercises:  Exercise Reps/ Time Weight/ Level Comments   UBE Forward/Retro 2/2     Supine Protraction 10 reps x 2 sets     Side lying (R) Shoulder Flexion (AROM) 10 reps     Supine (R) shoulder abduction  10 reps      Seated (B) Shoulder Shrugs 10 reps x 3 sets     Seated (B) Scapular Retraction 10 reps x 3 sets       Passive Stretching   Supine (R) shoulder flexion stretch 30 sec hold  3 reps   Supine (R) shoulder abduction stretch 30 sec

## 2023-08-24 ENCOUNTER — HOSPITAL ENCOUNTER (OUTPATIENT)
Dept: PHYSICAL THERAPY | Age: 78
Setting detail: THERAPIES SERIES
Discharge: HOME | End: 2023-08-24
Payer: MEDICARE

## 2023-08-24 PROCEDURE — 97110 THERAPEUTIC EXERCISES: CPT

## 2023-08-24 NOTE — FLOWSHEET NOTE
[x] [facility]) [facility] & Therapy  [address]  [place]: [phone]   F: [phone]    Physical Therapy Daily Treatment Note    Date:  23  Patient: Charisse Alvarez  : 1945  MRN: [id number]  Physician: Amanda Carvalho MD    Medical Diagnosis: Tear of right rotator cuff, unspecified tear extent, unspecified whether traumatic [M75.101]   Osteoarthritis of AC (acromioclavicular) joint [M19.019]   Subacromial impingement of right shoulder [M75.41]       Rehab Codes: Onset date: 2023 - surgery date    Next Dr's appt.: 8/15/23    PT Visit Information  PT Insurance Information: Medical [place] Medicare Advantage  Total # of Visits Approved: 30  Total # of Visits to Date: 14  No Show: 0  Canceled Appointment: 0    Subjective  Patient states that he is \"feeling okay\" and pain free in the (R) shoulder upon arrival. He reports some soreness from his previous session, however denies complications with the (R) shoulder. Pain:  [] Yes   [x] No      Location: right shoulder tightness   Pain Ratin/10    Objective  Modalities:  Precautions: Protocol given by Dr. White Anchors Phase IV - 14 weeks post op as of 2023   Exercises:  Exercise Reps/ Time Weight/ Level Comments   UBE Forward/Retro 2/2     Supine Protraction 10 reps x 2 sets     Side lying (R) Shoulder Flexion (AROM) 10 reps     Supine (R) shoulder abduction  10 reps      Seated (B) Shoulder Shrugs 10 reps x 3 sets     Seated (B) Scapular Retraction 10 reps x 3 sets       Passive Stretching   Supine (R) shoulder flexion stretch 30 sec hold  3 reps   Supine (R) shoulder abduction stretch 30 sec hold x 3 reps   Supine (R) shoulder ER @ 75* ABD stretch 30 sec x 3 reps  Supine (R) shoulder IR @ 75* ABD stretch 30 sec hold x 3 reps     Specific Instructions for next treatment: Continue to progress per Dr. Brenton Villalpando protocol with scapular stability and improve passive/active mobility    Pt.  Education:  []

## 2023-08-29 ENCOUNTER — HOSPITAL ENCOUNTER (OUTPATIENT)
Dept: PHYSICAL THERAPY | Age: 78
Setting detail: THERAPIES SERIES
Discharge: HOME | End: 2023-08-29
Payer: MEDICARE

## 2023-08-29 PROCEDURE — 97110 THERAPEUTIC EXERCISES: CPT

## 2023-08-29 NOTE — FLOWSHEET NOTE
[x] [facility]) - [facility] & Therapy  [address]  Alicia Cande: [phone]   F: [phone]    Physical Therapy Daily Treatment Note    Date:  23  Patient: oTnya Melendez  : 1945  MRN: [id number]  Physician: Clarita Frances MD    Medical Diagnosis: Tear of right rotator cuff, unspecified tear extent, unspecified whether traumatic [M75.101]   Osteoarthritis of AC (acromioclavicular) joint [M19.019]   Subacromial impingement of right shoulder [M75.41]       Rehab Codes: Onset date: 2023 - surgery date    Next Dr's appt.: 8/15/23    PT Visit Information  PT Insurance Information: [employer] Medicare Advantage  Total # of Visits Approved: 30  Total # of Visits to Date: 14  No Show: 0  Canceled Appointment: 0    Subjective  Patient reports that his (R) shoulder is doing good today. States that he was a little sore after his previous session. Does exercises on the days he does not come to therapy and hasn't had any complications since.       Pain:  [] Yes   [x] No      Location: right shoulder tightness   Pain Ratin/10    Objective  Modalities:  Precautions: Protocol given by Dr. Khushbu Benitez Phase IV - 14 weeks post op as of 2023   Exercises:  Exercise Reps/ Time Weight/ Level Comments   UBE Forward/Retro 2/2     Supine Protraction 10 reps x 2 sets     Side lying (R) Shoulder Flexion (AROM) 10 reps     Supine (R) shoulder abduction  10 reps      Seated (B) Shoulder Shrugs 10 reps 5 sec hold x 3 sets     Seated (B) Scapular Retraction 10 reps x 3 sets       Passive Stretching   Supine (R) shoulder flexion stretch 30 sec hold  3 reps   Supine (R) shoulder abduction stretch 30 sec hold x 3 reps   Supine (R) shoulder ER @ 75* ABD stretch 30 sec x 3 reps  Supine (R) shoulder IR @ 75* ABD stretch 30 sec hold x 3 reps     Specific Instructions for next treatment: Continue to progress per Dr. Candie Carlos protocol with scapular stability and improve passive/active

## 2023-08-31 ENCOUNTER — HOSPITAL ENCOUNTER (OUTPATIENT)
Dept: PHYSICAL THERAPY | Age: 78
Setting detail: THERAPIES SERIES
Discharge: HOME | End: 2023-08-31
Payer: MEDICARE

## 2023-08-31 PROCEDURE — 97110 THERAPEUTIC EXERCISES: CPT

## 2023-08-31 NOTE — FLOWSHEET NOTE
[x] [address] [facility] Therapy  [address]  [place]: [phone]   F: [phone]    Physical Therapy Daily Treatment Note    Date:  23  Patient: Maria Luisa Pierre  : 1945  MRN: [id number]  Physician: Dwayne Hector MD    Medical Diagnosis: Tear of right rotator cuff, unspecified tear extent, unspecified whether traumatic [M75.101]   Osteoarthritis of AC (acromioclavicular) joint [M19.019]   Subacromial impingement of right shoulder [M75.41]       Rehab Codes: Onset date: 2023 - surgery date    Next Dr's appt.: 8/15/23    PT Visit Information  PT Insurance Information: Medical [place] Medicare Advantage  Total # of Visits Approved: 30  Total # of Visits to Date: 15  No Show: 0  Canceled Appointment: 0    Subjective  Patient is pain free in the (R) shoulder upon arrival. He states that he felt good after his previous session and felt that he worked. He reports no complications or changes since his last treatment. Pain:  [] Yes   [x] No      Location: right shoulder tightness   Pain Ratin/10    Objective  Modalities:  Precautions: Protocol given by Dr. Dewayne Todd Phase IV - 14 weeks post op as of 2023   Exercises:  Exercise Reps/ Time Weight/ Level Comments   UBE Forward/Retro 2/2     Supine Protraction 10 reps x 2 sets 2 lbs    Side lying (R) Shoulder Flexion (AROM) 10 reps     Supine (R) shoulder abduction  10 reps      Seated (B) Shoulder Shrugs 10 reps x 3 sets 2 lbs    Seated (B) Scapular Retraction 10 reps x 3 sets       Passive Stretching   Supine (R) shoulder flexion stretch 30 sec hold  3 reps   Supine (R) shoulder abduction stretch 30 sec hold x 3 reps   Supine (R) shoulder ER @ 75* ABD stretch 30 sec x 3 reps  Supine (R) shoulder IR @ 75* ABD stretch 30 sec hold x 3 reps     Specific Instructions for next treatment: Continue to progress per Dr. Eric Snow protocol with scapular stability and improve passive/active mobility    Pt.

## 2023-09-05 ENCOUNTER — HOSPITAL ENCOUNTER (OUTPATIENT)
Dept: PHYSICAL THERAPY | Age: 78
Setting detail: THERAPIES SERIES
Discharge: HOME | End: 2023-09-05
Payer: MEDICARE

## 2023-09-05 ENCOUNTER — OFFICE VISIT (OUTPATIENT)
Dept: NEUROSURGERY | Age: 78
End: 2023-09-05
Payer: MEDICARE

## 2023-09-05 VITALS
HEIGHT: 68 IN | DIASTOLIC BLOOD PRESSURE: 79 MMHG | RESPIRATION RATE: 16 BRPM | SYSTOLIC BLOOD PRESSURE: 139 MMHG | WEIGHT: 127 LBS | BODY MASS INDEX: 19.25 KG/M2 | OXYGEN SATURATION: 97 % | HEART RATE: 84 BPM

## 2023-09-05 DIAGNOSIS — S12.191D OTHER CLOSED NONDISPLACED FRACTURE OF SECOND CERVICAL VERTEBRA WITH ROUTINE HEALING, SUBSEQUENT ENCOUNTER: Primary | ICD-10-CM

## 2023-09-05 DIAGNOSIS — R20.0 NUMBNESS AND TINGLING IN BOTH HANDS: ICD-10-CM

## 2023-09-05 DIAGNOSIS — M48.02 CERVICAL STENOSIS OF SPINE: ICD-10-CM

## 2023-09-05 DIAGNOSIS — R20.2 NUMBNESS AND TINGLING IN BOTH HANDS: ICD-10-CM

## 2023-09-05 DIAGNOSIS — Z98.1 HISTORY OF FUSION OF CERVICAL SPINE: ICD-10-CM

## 2023-09-05 PROCEDURE — 97110 THERAPEUTIC EXERCISES: CPT

## 2023-09-05 PROCEDURE — 99214 OFFICE O/P EST MOD 30 MIN: CPT | Performed by: NURSE PRACTITIONER

## 2023-09-05 PROCEDURE — 1123F ACP DISCUSS/DSCN MKR DOCD: CPT | Performed by: NURSE PRACTITIONER

## 2023-09-05 NOTE — PROGRESS NOTES
[address] [facility],[address] NEUROSURGERY CENTER [place] [address] [address]  [facility] [address], [address] [place] [address]  Dept: [phone]    Patient:  Francesca Hood  YOB: 1945  Date: 7/10/23    The patient is a 78 y.o. male who presents today for consult of the following problems:     Chief Complaint   Patient presents with    Neurologic Problem      nondisplaced fracture of second cervical vertebra with routine healing, subsequent encounter         HPI:     Francesca Hood is a 78 y.o. male on whom neurosurgical consultation was requested by VINICIO Bhatia CNP for management of C2 fracture and spinal stenosis. Patient sustained a fall in March down the stairs in his home, was found to have acute C2 fracture, as well as right shoulder injury. Cervical fracture treated nonsurgically, found to be stable. Patient does have history significant for C3-C5 anterior fusion in 2014. Denies any current neck pain. Does feel some mild decreased to range of motion. Ultimately diagnosed with right rotator cuff tear, underwent surgery. Recently started physical therapy to work on shoulder, arm as well as cervical range of motion last week. Initially was having some diminished sensation to feet when walking in socks or slippers, feels that this has improved. Some stiffness to right hand when he first wakes up in the morning, improves after he mobilizes, does report having decreased mobility to right arm since injury from shoulder injury. No left hand symptoms. Denies any current gait instability. Fall believed to be precipitated by unsteadiness secondary to UTI. This has been treated, no longer having issues. Did complete flexion-extension x-rays approximately 2 months ago for further monitoring of cervical fracture, these were stable with no evidence of dynamic instability.     Patient denies any distinct hand weakness, dexterity

## 2023-09-05 NOTE — FLOWSHEET NOTE
needling        [] 1 or 2 muscles        [] 3 or more muscles     []  Other     Total Treatment time 45 3     Time In: 2304        Time Out: 1130    Electronically signed by:  PATRICIA LittleT

## 2023-09-07 ENCOUNTER — HOSPITAL ENCOUNTER (OUTPATIENT)
Dept: PHYSICAL THERAPY | Age: 78
Setting detail: THERAPIES SERIES
Discharge: HOME | End: 2023-09-07
Payer: MEDICARE

## 2023-09-07 PROCEDURE — 97110 THERAPEUTIC EXERCISES: CPT

## 2023-09-07 NOTE — FLOWSHEET NOTE
[x] [facility]) [facility] Therapy  [address]  [place]: [phone]   F: [phone]    Physical Therapy Daily Treatment Note    Date:  23  Patient: Niraj Coppola  : 1945  MRN: [id number]  Physician: Johana Cao MD    Medical Diagnosis: Tear of right rotator cuff, unspecified tear extent, unspecified whether traumatic [M75.101]   Osteoarthritis of AC (acromioclavicular) joint [M19.019]   Subacromial impingement of right shoulder [M75.41]       Rehab Codes: Onset date: 2023 - surgery date    Next Dr's appt.: 8/15/23    PT Visit Information  PT Insurance Information: Medical [place] Medicare Advantage  Total # of Visits Approved: 30  Total # of Visits to Date: 16  No Show: 0  Canceled Appointment: 0    Subjective  Patient is pain-free in the (R) shoulder upon arrival. He mentions that some motions are causing him discomfort but they are not painful. The motions that bring on the discomfort are unable to be determined by the patient. Patient noted that he no longer has difficulty with doffing his shirt and that his ability to put his (R) hand on his hip is improving.     Pain:  [] Yes   [x] No      Location: right shoulder tightness   Pain Ratin/10    Objective  Modalities:  Precautions: Protocol given by Dr. Naya Clements Phase IV - 16 weeks post op as of 2023   Exercises:  Exercise Reps/ Time Weight/ Level Comments   UBE Forward/Retro 3/3     Supine Protraction 10 reps x 2 sets 2 lbs    Side lying (R) Shoulder Flexion (AROM) 10 reps     Supine (R) shoulder abduction  10 reps      Seated (B) Shoulder Shrugs 10 reps x 3 sets 2 lbs    Seated (B) Scapular Retraction 10 reps x 3 sets       Passive Stretching   Supine (R) shoulder flexion stretch 30 sec hold  3 reps   Supine (R) shoulder abduction stretch 30 sec hold x 3 reps   Supine (R) shoulder ER @ 90 ABD stretch 30 sec x 3 reps  Supine (R) shoulder IR @ 90 ABD stretch 30 sec hold x 3 reps

## 2023-09-12 ENCOUNTER — HOSPITAL ENCOUNTER (OUTPATIENT)
Dept: PHYSICAL THERAPY | Age: 78
Setting detail: THERAPIES SERIES
Discharge: HOME | End: 2023-09-12
Payer: MEDICARE

## 2023-09-12 PROCEDURE — 97110 THERAPEUTIC EXERCISES: CPT

## 2023-09-12 NOTE — FLOWSHEET NOTE
[x] [facility]) [facility] Therapy  [address]  [place]: [phone]   F: [phone]    Physical Therapy Daily Treatment Note    Date:  23  Patient: Giuliana Guerrero  : 1945  MRN: [id number]  Physician: Oli Chu MD    Medical Diagnosis: Tear of right rotator cuff, unspecified tear extent, unspecified whether traumatic [M75.101]   Osteoarthritis of AC (acromioclavicular) joint [M19.019]   Subacromial impingement of right shoulder [M75.41]       Rehab Codes: Onset date: 2023 - surgery date    Next Dr's appt.: 8/15/23    PT Visit Information  PT Insurance Information: Medical [place] Medicare Advantage  Total # of Visits Approved: 30  Total # of Visits to Date:   No Show: 0  Canceled Appointment: 0    Subjective  Patient is pain-free in the (R) shoulder upon arrival. He states that his ability to put his (R) hand on his hip and behind his back are improving. He reports no complications since his last treatment session.      Pain:  [] Yes   [x] No      Location: right shoulder tightness   Pain Ratin/10    Objective  Modalities:  Precautions: Protocol given by Dr. Sudheer Willis Phase IV - 16 weeks post op as of 2023   Exercises:  Exercise Reps/ Time Weight/ Level Comments   UBE Forward/Retro 3/3     Supine Protraction 10 reps x 2 sets 2 lbs    Side lying (R) Shoulder Flexion (AROM) 10 reps     Side lying (R) Shoulder ER 10 reps x 2 sets     Supine (R) shoulder abduction  10 reps      Seated (B) Shoulder Shrugs 10 reps x 3 sets 4 lbs    Seated (B) Scapular Retraction 10 reps x 3 sets     Seated (R) bicep curl 20 reps x 3 sets 5 lbs  Last set was difficulty, 4 lbs may be more sufficient    Standing (R) rhythmic stabilization; ball to wall 30 sec in each direction 2 lb ball Up/down  Lateral  Clockwise/counterclockwise     Passive Stretching   Supine (R) shoulder flexion stretch 30 sec hold  3 reps   Supine (R) shoulder abduction stretch 30 sec

## 2023-09-14 ENCOUNTER — OFFICE VISIT (OUTPATIENT)
Dept: INTERNAL MEDICINE CLINIC | Age: 78
End: 2023-09-14
Payer: MEDICARE

## 2023-09-14 VITALS
HEIGHT: 68 IN | SYSTOLIC BLOOD PRESSURE: 120 MMHG | HEART RATE: 83 BPM | OXYGEN SATURATION: 96 % | DIASTOLIC BLOOD PRESSURE: 70 MMHG | BODY MASS INDEX: 19.25 KG/M2 | WEIGHT: 127 LBS

## 2023-09-14 DIAGNOSIS — Z00.00 MEDICARE ANNUAL WELLNESS VISIT, SUBSEQUENT: Primary | ICD-10-CM

## 2023-09-14 PROCEDURE — 1123F ACP DISCUSS/DSCN MKR DOCD: CPT | Performed by: NURSE PRACTITIONER

## 2023-09-14 PROCEDURE — G0439 PPPS, SUBSEQ VISIT: HCPCS | Performed by: NURSE PRACTITIONER

## 2023-09-14 ASSESSMENT — PATIENT HEALTH QUESTIONNAIRE - PHQ9
SUM OF ALL RESPONSES TO PHQ9 QUESTIONS 1 & 2: 0
SUM OF ALL RESPONSES TO PHQ QUESTIONS 1-9: 0
1. LITTLE INTEREST OR PLEASURE IN DOING THINGS: 0
SUM OF ALL RESPONSES TO PHQ QUESTIONS 1-9: 0
2. FEELING DOWN, DEPRESSED OR HOPELESS: 0

## 2023-09-14 NOTE — PROGRESS NOTES
Visit Information    Have you changed or started any medications since your last visit including any over-the-counter medicines, vitamins, or herbal medicines? no   Are you having any side effects from any of your medications? -  no  Have you stopped taking any of your medications? Is so, why? -  no    Have you seen any other physician or provider since your last visit? Yes - Records Obtained  Have you had any other diagnostic tests since your last visit? Yes - Records Obtained  Have you been seen in the emergency room and/or had an admission to a hospital since we last saw you? Yes - Records Obtained  Have you had your routine dental cleaning in the past 6 months? no    Have you activated your [employer] account? If not, what are your barriers?  Yes     Patient Care Team:  VINICIO West CNP as PCP - General (Internal Medicine)  VINICIO West CNP [facility]  Kim Noonan MD as Consulting Physician (Urology)    Medical History Review  Past Medical, Family, and Social History reviewed and does contribute to the patient presenting condition    Health Maintenance   Topic Date Due    COVID-19 Vaccine (1) Never done    Shingles vaccine (1 of 2) Never done    Annual Wellness Visit (AWV)  03/11/2022    Flu vaccine (1) 08/01/2023    Colorectal Cancer Screen  03/23/2024    Depression Screen  04/04/2024    Prostate Specific Antigen (PSA) Screening or Monitoring  07/27/2024    Lipids  04/07/2028    DTaP/Tdap/Td vaccine (2 - Td or Tdap) 03/15/2031    Pneumococcal 65+ years Vaccine  Completed    Hepatitis C screen  Completed    Hepatitis A vaccine  Aged Out    Hepatitis B vaccine  Aged Out    Hib vaccine  Aged Out    Meningococcal (ACWY) vaccine  Aged Out    Medicare Annual Wellness Visit    Suellenmarjorie Victor is here for Lucas Burns AWV    Assessment & Plan   Medicare annual wellness visit, subsequent  Recommendations for Preventive Services Due: see orders and patient

## 2023-09-14 NOTE — PATIENT INSTRUCTIONS
list of these orders (if applicable) as well as your Preventive Care list are included within your After Visit Summary for your review. Other Preventive Recommendations:    A preventive eye exam performed by an eye specialist is recommended every 1-2 years to screen for glaucoma; cataracts, macular degeneration, and other eye disorders. A preventive dental visit is recommended every 6 months. Try to get at least 150 minutes of exercise per week or 10,000 steps per day on a pedometer . Order or download the FREE \"Exercise & Physical Activity: Your Everyday Guide\" from [employer]. Call 2-542.796.1457 or search [employer] online. You need 2632-4400 mg of calcium and 9420-0869 IU of vitamin D per day. It is possible to meet your calcium requirement with diet alone, but a vitamin D supplement is usually necessary to meet this goal.  When exposed to the sun, use a sunscreen that protects against both UVA and UVB radiation with an SPF of 30 or greater. Reapply every 2 to 3 hours or after sweating, drying off with a towel, or swimming. Always wear a seat belt when traveling in a car. Always wear a helmet when riding a bicycle or motorcycle.

## 2023-09-15 ENCOUNTER — APPOINTMENT (OUTPATIENT)
Dept: PHYSICAL THERAPY | Age: 78
End: 2023-09-15
Payer: MEDICARE

## 2023-09-15 ENCOUNTER — CLINICAL DOCUMENTATION (OUTPATIENT)
Dept: SPIRITUAL SERVICES | Age: 78
End: 2023-09-15

## 2023-09-15 NOTE — ACP (ADVANCE CARE PLANNING)
Advance Care Planning   Ambulatory ACP Specialist Patient Outreach    Date:  9/15/2023    ACP Specialist:  LAM Kelsey    Outreach call to patient in follow-up to ACP Specialist referral from:VINICIO Platt CNP    [x] PCP  [] Provider   [] Ambulatory Care Management [] Other     For:                  [x] Advance Directive Assistance              [] Complete Portable DNR order              [] Complete POST/POLST/MOST              [] Code Status Discussion             [] Discuss Goals of Care             [] Early ACP Decision-Making              [] Other (Specify)    Date Referral Received: 9/14/23    Next Step:   [] ACP scheduled conversation  [x] Outreach again in one week               [] Email / Mail [address]  [] Email / Mail Advance Directive   [] Closing referral.  Routing closure to referring provider/staff and to ACP Specialist . [] Closure letter mailed to patient with invitation to contact ACP Specialist if / when ready. [] Other (Specify here):         [x] At this time, Healthcare Decision Maker Is: Healthcare Decision Maker:    Primary Decision Maker: Dominick Álvarez - Child - [phone]    Primary Decision Maker: Harini Garcia - Child - [phone]    Primary Decision Maker: Theresa Ang Child - [phone]         [] Primary agent named in scanned advance directive. [x] Legal Next of Kin. [] Unable to determine legal decision maker at this time. Outreaches:       [x] 1st -  Date:       9/14/23    Intervention:  [] Spoke with Patient   [x] Left Voice mail [] Email / Mail    [x] MyChart  [] Other (Specify) : Outcomes: ACP Specialist left a message with patient. Patient instructions in demographics give number to call as [phone] requesting to ask for Katherine with any appointments or reminders. A message was left at this number and also patient's mobile at [phone]. A [employer] message was also sent to patient.     Patient's daughter Dominick Álvarez

## 2023-09-18 ENCOUNTER — HOSPITAL ENCOUNTER (OUTPATIENT)
Dept: PHYSICAL THERAPY | Age: 78
Setting detail: THERAPIES SERIES
Discharge: HOME | End: 2023-09-18
Payer: MEDICARE

## 2023-09-18 PROCEDURE — 97110 THERAPEUTIC EXERCISES: CPT

## 2023-09-18 NOTE — FLOWSHEET NOTE
[x] [facility]) [facility]  [address]  [place]: [phone]   F: [phone]    Physical Therapy Daily Treatment Note    Date:  23  Patient: Jeanie Sal  : 1945  MRN: [id number]  Physician: Sonya June MD    Medical Diagnosis: Tear of right rotator cuff, unspecified tear extent, unspecified whether traumatic [M75.101]   Osteoarthritis of AC (acromioclavicular) joint [M19.019]   Subacromial impingement of right shoulder [M75.41]       Rehab Codes: (R) shoulder pain (M25.511) with limited motion (M25.611)  Onset date: 2023 - surgery date    Next Dr's appt.: 8/15/23    PT Visit Information  PT Insurance Information: Medical [place] Medicare Advantage  Total # of Visits Approved: 30  Total # of Visits to Date:   No Show: 0  Canceled Appointment: 0    Subjective  Patient is pain free in the (R) shoulder upon arrival. He notes some pain in the (R) shoulder with certain movements like reaching. However, the pain goes away once he is out of the movement. Patient reports no complications after his previous therapy session.      Pain:  [] Yes   [x] No      Location: right shoulder tightness   Pain Ratin/10    Objective  Modalities:  Precautions: Protocol given by Dr. Donovan Solid Phase IV - 17 weeks post op as of 2023   Exercises:  Exercise Reps/ Time Weight/ Level Comments   UBE Forward/Retro 3/3     Supine Protraction 10 reps x 3 sets 2 lbs    Supine (R) shoulder abduction 10 reps     Side lying (R) Shoulder Flexion (AROM) 10 reps     Side lying (R) Shoulder ER 10 reps x 3 sets     Seated (B) Shoulder Shrugs 10 reps x 3 sets 4 lbs    Seated (B) Scapular Retraction 10 reps x 3 sets     Seated (R) bicep curl 20 reps x 3 sets 4 lbs     Standing (R) rhythmic stabilization; ball to wall 30 sec in each direction 2 lb ball Up/down  Lateral  Clockwise/counterclockwise     Passive Stretching   Supine (R) shoulder flexion stretch 30 sec hold  3

## 2023-09-22 ENCOUNTER — HOSPITAL ENCOUNTER (OUTPATIENT)
Dept: PHYSICAL THERAPY | Age: 78
Setting detail: THERAPIES SERIES
Discharge: HOME | End: 2023-09-22
Payer: MEDICARE

## 2023-09-22 PROCEDURE — 97110 THERAPEUTIC EXERCISES: CPT

## 2023-09-22 NOTE — FLOWSHEET NOTE
[x] [facility]) [facility]  [address]  [place]: [phone]   F: [phone]    Physical Therapy Daily Treatment Note    Date:  23  Patient: Santana Solis  : 1945  MRN: [id number]  Physician: Sonal Jesus MD    Medical Diagnosis: Tear of right rotator cuff, unspecified tear extent, unspecified whether traumatic [M75.101]   Osteoarthritis of AC (acromioclavicular) joint [M19.019]   Subacromial impingement of right shoulder [M75.41]       Rehab Codes: (R) shoulder pain (M25.511) with limited motion (M25.611)  Onset date: 2023 - surgery date    Next Dr's appt.: 8/15/23    PT Visit Information  PT Insurance Information: Medical [place] Medicare Advantage  Total # of Visits Approved: 30  Total # of Visits to Date: 20  No Show: 0  Canceled Appointment: 0    Subjective  Patient is pain free in the (R) shoulder upon arrival. He continues to notice an increase in pain when reaching forward, however, the pain diminishes once he is out of the position.       Pain:  [] Yes   [x] No      Location: right shoulder tightness   Pain Ratin/10    Objective  Modalities:  Precautions: Protocol given by Dr. Elizabeth Cardozo Phase IV - 18 weeks post op as of 2023   Exercises:  Exercise Reps/ Time Weight/ Level Comments   UBE Forward/Retro 3/3     Standing (R) Shoulder ER 10 reps x 3 sets Green band  With towel   Standing (R) Shoulder IR 10 reps x 3 sets  Green band With towel   Standing Shoulder Abduction 10 reps x 3 sets Red band    Standing Shoulder Diagonal Pulls 10 reps x 2 sets Red band Tactile and verbal cueing to decrease shoulder elevation    Standing (B) Shoulder Shrugs 10 reps x 3 sets 4 lbs    Seated (R) bicep curl 20 reps x 3 sets 4 lbs     Standing (R) rhythmic stabilization; ball to wall 30 sec in each direction 2 lb ball Up/down  Lateral  Clockwise/counterclockwise   Seated Shoulder Rows 10 reps x 2 sets 10 lbs      Passive Stretching   Supine

## 2023-09-25 ENCOUNTER — HOSPITAL ENCOUNTER (OUTPATIENT)
Dept: PHYSICAL THERAPY | Age: 78
Setting detail: THERAPIES SERIES
Discharge: HOME | End: 2023-09-25
Payer: MEDICARE

## 2023-09-25 PROCEDURE — 97110 THERAPEUTIC EXERCISES: CPT

## 2023-09-25 NOTE — FLOWSHEET NOTE
[x] [facility]) [facility]  [address]  [place]: [phone]   F: [phone]    Physical Therapy Daily Treatment Note    Date:  23  Patient: Macarena Zaldivar  : 1945  MRN: [id number]  Physician: Raiza Mcmillan MD    Medical Diagnosis: Tear of right rotator cuff, unspecified tear extent, unspecified whether traumatic [M75.101]   Osteoarthritis of AC (acromioclavicular) joint [M19.019]   Subacromial impingement of right shoulder [M75.41]       Rehab Codes: (R) shoulder pain (M25.511) with limited motion (M25.611)  Onset date: 2023 - surgery date    Next Dr's appt.: 8/15/23    PT Visit Information  PT Insurance Information: Medical [place] Medicare Advantage  Total # of Visits Approved: 30  Total # of Visits to Date: 21  No Show: 0  Canceled Appointment: 0    Subjective  Patient is pain free in the (R) shoulder upon arrival. Patient continues to notice some pain in the (R) shoulder with reaching forward, however, he reports that it does not occur consistently. Patient reports that the exercises added during the previous session worked well. He reports no complications since his last visit.        Pain:  [] Yes   [x] No      Location: right shoulder tightness   Pain Ratin/10    Objective  Modalities:  Precautions: Protocol given by Dr. Gely Richardson Phase IV - 18 weeks post op as of 2023   Exercises:  Exercise Reps/ Time Weight/ Level Comments   UBE Forward/Retro 3/3     Standing (R) Shoulder ER 10 reps x 3 sets Green band  With towel   Standing (R) Shoulder IR 10 reps x 3 sets  Green band With towel   Standing Horizontal Shoulder Abduction 10 reps x 3 sets Red band    Standing Shoulder Diagonal Pulls 10 reps x 2 sets Red band Tactile and verbal cueing to decrease shoulder elevation    Standing (B) Shoulder Shrugs 10 reps x 3 sets 4 lbs    Seated (R) bicep curl 20 reps x 3 sets 4 lbs     Seated (R) Shoulder Abduction  10 reps   Hold for 2-3

## 2023-09-27 ENCOUNTER — HOSPITAL ENCOUNTER (OUTPATIENT)
Dept: PHYSICAL THERAPY | Age: 78
Setting detail: THERAPIES SERIES
Discharge: HOME | End: 2023-09-27
Payer: MEDICARE

## 2023-09-27 PROCEDURE — 97110 THERAPEUTIC EXERCISES: CPT

## 2023-09-27 NOTE — FLOWSHEET NOTE
[x] [facility]) [facility]  [address]  [place]: [phone]   F: [phone]    Physical Therapy Daily Treatment Note    Date:  23  Patient: Rubi Harmon  : 1945  MRN: [id number]  Physician: Aniya Barba MD    Medical Diagnosis: Tear of right rotator cuff, unspecified tear extent, unspecified whether traumatic [M75.101]   Osteoarthritis of AC (acromioclavicular) joint [M19.019]   Subacromial impingement of right shoulder [M75.41]       Rehab Codes: (R) shoulder pain (M25.511) with limited motion (M25.611)  Onset date: 2023 - surgery date    Next Dr's appt.: 23    PT Visit Information  PT Insurance Information: Medical [place] Medicare Advantage  Total # of Visits Approved: 30  Total # of Visits to Date:   No Show: 0  Canceled Appointment: 0    Subjective  Patient reporting to PT with no new complaints.  Patient states he is pain free but still feels limited in both flexion, abduction, and reaching his back due to weakness    Pain:  [] Yes   [x] No      Location: right shoulder tightness   Pain Ratin/10    Objective  Modalities:  Precautions: Protocol given by Dr. Bart Tierney Phase IV - 19 weeks post op as of 2023   Exercises:  Exercise Reps/ Time Weight/ Level Comments   UBE Forward/Retro 3/3     Standing (R) Shoulder ER 10 reps x 3 sets Green band  With towel   Standing (R) Shoulder IR 10 reps x 3 sets  Green band With towel   Standing Horizontal Shoulder Abduction 10 reps x 3 sets Green band    Standing Shoulder Diagonal Pulls 10 reps x 2 sets Red band Tactile and verbal cueing to decrease shoulder elevation    Seated (R) bicep curl 20 reps x 3 sets 4 lbs     Seated (R) Shoulder Abduction  10 reps x 2 sets  Hold for 2-3 seconds at end range    Standing (R) rhythmic stabilization; ball to wall 30 sec in each direction 2 lb ball Up/down  Lateral  Clockwise/counterclockwise   Standing Shoulder Rows 10 reps x 2 sets 15 lbs

## 2023-09-28 ENCOUNTER — CLINICAL DOCUMENTATION (OUTPATIENT)
Dept: SPIRITUAL SERVICES | Age: 78
End: 2023-09-28

## 2023-09-28 NOTE — ACP (ADVANCE CARE PLANNING)
Advance Care Planning   Ambulatory ACP Specialist Patient Outreach    Date:  9/28/2023 09/29/2023    ACP Specialist:  CORINA Miller    Outreach call to patient in follow-up to ACP Specialist referral from:VINICIO Montano CNP    [x] PCP  [] Provider   [] Ambulatory Care Management [] Other     For:                  [x] Advance Directive Assistance              [] Complete Portable DNR order              [] Complete POST/POLST/MOST              [] Code Status Discussion             [] Discuss Goals of Care             [] Early ACP Decision-Making              [] Other (Specify)    Date Referral Received:09/14/2023    Next Step:   [] ACP scheduled conversation  [x] Outreach again next week               [] Email / Mail [address]  [] Email / Mail Advance Directive   [] Closing referral.  Routing closure to referring provider/staff and to ACP Specialist . [] Closure letter mailed to patient with invitation to contact ACP Specialist if / when ready. [] Other (Specify here):         [x] At this time, Healthcare Decision Maker Is:        [] Primary agent named in scanned advance directive. [x] Legal Next of Kin. [] Unable to determine legal decision maker at this time. Outreaches:       [x]  Additional Outreach -  Date:  09/28/2023   (Specify Dates & special circumstances):REMINDER CALL    Outcomes: Reminder call placed re: ACP visit set for Fri Sept 29 at 1pm. Left VM. [x]  Additional Outreach -  Date:  09/29/2023   (Specify Dates & special circumstances): ACP visit     Outcomes: Placed call to pt for scheduled ACP visit. Pt's daughter, Nahomi Anthony (DEVIN on file) answered and apologized but needs to reschedule. Nahomi Anthony was driving and unable to check calendar. Will reach out to pt, daughter by next Tuesday to reschedule.        Thank you for this referral.

## 2023-10-03 ENCOUNTER — HOSPITAL ENCOUNTER (OUTPATIENT)
Dept: PHYSICAL THERAPY | Age: 78
Setting detail: THERAPIES SERIES
Discharge: HOME | End: 2023-10-03
Payer: MEDICARE

## 2023-10-03 PROCEDURE — 97110 THERAPEUTIC EXERCISES: CPT

## 2023-10-03 NOTE — FLOWSHEET NOTE
strength in all planes to be WNL compared to the (L) to assist with (I) ADLs without limitation and facilitate return to target shooting. Patient will improve Quick DASH score to </= 30% disability to show clinically meaningful improvement in (R) shoulder function and facilitate return to recreational activities such as gardening. Patient will demonstrate independence with his home exercise program  at discharge. Patient goals: Improve (R) shoulder motion and strength; Return to gardening and target shooting     Plan: [x] Continue per plan of care.    [] Other:      Treatment Charges: Mins Units   []  Modalities     [x]  Ther Exercise 50 3   []  Manual Therapy     []  Ther Activities     []  Aquatics     []  Neuromuscular     [] Vasocompression     [] Gait Training     [] Dry needling        [] 1 or 2 muscles        [] 3 or more muscles     []  Other     Total Treatment time 50 3     Time In: 6352     Time Out: 0044    Electronically signed by:  ALEJANDRO Toledo

## 2023-10-05 ENCOUNTER — HOSPITAL ENCOUNTER (OUTPATIENT)
Dept: PHYSICAL THERAPY | Age: 78
Setting detail: THERAPIES SERIES
Discharge: HOME | End: 2023-10-05
Payer: MEDICARE

## 2023-10-05 PROCEDURE — 97110 THERAPEUTIC EXERCISES: CPT

## 2023-10-05 NOTE — FLOWSHEET NOTE
[x] [address] [facility]  [address]  [place]: [phone]   F: [phone]    Physical Therapy Daily Treatment Note    Date:  10/05/23  Patient: Macarena Zaldivar  : 1945  MRN: [id number]  Physician: Raiza Mcmillan MD    Medical Diagnosis: Tear of right rotator cuff, unspecified tear extent, unspecified whether traumatic [M75.101]   Osteoarthritis of AC (acromioclavicular) joint [M19.019]   Subacromial impingement of right shoulder [M75.41]       Rehab Codes: (R) shoulder pain (M25.511) with limited motion (M25.611)  Onset date: 2023 - surgery date    Next Dr's appt.: 23    PT Visit Information  PT Insurance Information: Medical [place] Medicare Advantage  Total # of Visits Approved: 30  Total # of Visits to Date: 24  No Show: 0  Canceled Appointment: 0    Subjective  Patient reports no pain currently, but notices when he does a \"table saw\" motion, he feels a \"crunching\".      Pain:  [] Yes   [x] No      Location: right shoulder tightness   Pain Ratin/10    Objective  Modalities:  Precautions: Protocol given by Dr. Gely Richardson Phase IV - 20 weeks post op as of 10/4/2023   Exercises:  Exercise Reps/ Time Weight/ Level Comments   UBE Forward/Retro 2/2     Standing (R) Shoulder ER 10 reps x 3 sets Green band  With towel   Standing (R) Shoulder IR 10 reps x 3 sets  Green band With towel   Standing Horizontal Shoulder Abduction 10 reps x 3 sets Green band    Seated (R) bicep curl 20 reps x 3 sets 4 lbs     Seated (R) Shoulder Abduction  10 reps x 2 sets  Hold for 2-3 seconds at end range    Standing (R) rhythmic stabilization; ball to wall 30 sec in each direction 4 lb ball Up/down  Lateral  Clockwise/counterclockwise     Standing Shoulder Rows 10 reps x 3 sets 20 lbs    Wall push up + 10 reps x 2 sets       Passive Stretching   Supine (R) shoulder flexion stretch 30 sec hold  3 reps   Supine (R) shoulder abduction stretch 30 sec hold x 3 reps

## 2023-10-10 ENCOUNTER — HOSPITAL ENCOUNTER (OUTPATIENT)
Dept: PHYSICAL THERAPY | Age: 78
Setting detail: THERAPIES SERIES
Discharge: HOME | End: 2023-10-10
Payer: MEDICARE

## 2023-10-10 PROCEDURE — 97110 THERAPEUTIC EXERCISES: CPT

## 2023-10-10 NOTE — FLOWSHEET NOTE
[x] [facility]) [facility]  [address]  [place]: [phone]   F: [phone]    Physical Therapy Daily Treatment Note    Date:  10/10/23  Patient: Quinten Richardson  : 1945  MRN: [id number]  Physician: Velia Babcock MD    Medical Diagnosis: Tear of right rotator cuff, unspecified tear extent, unspecified whether traumatic [M75.101]   Osteoarthritis of AC (acromioclavicular) joint [M19.019]   Subacromial impingement of right shoulder [M75.41]       Rehab Codes: (R) shoulder pain (M25.511) with limited motion (M25.611)  Onset date: 2023 - surgery date  Next Dr's appt.: 23  PT Visit Information  PT Insurance Information: Medical [place] Medicare Advantage  Total # of Visits Approved: 30  Total # of Visits to Date: 25  No Show: 0  Canceled Appointment: 0    Subjective  Patient is pain free in the (R) shoulder upon arrival. He reports that some motions continue to aggravate (R) shoulder. However, he states that the pain is tolerable.      Pain:  [] Yes   [x] No      Location: right shoulder tightness   Pain Ratin/10    Objective  Modalities:  Precautions: Protocol given by Dr. Jose Pelletier Phase IV - 20 weeks post op as of 10/4/2023   Exercises:  Exercise Reps/ Time Weight/ Level Comments   UBE Forward/Retro 2/2     Standing (R) Shoulder ER 10 reps x 3 sets Green band  With towel   Standing (R) Shoulder IR 10 reps x 3 sets  Green band With towel   Standing Horizontal Shoulder Abduction 10 reps x 3 sets Green band    Seated (R) bicep curl 20 reps x 3 sets 4 lbs     Seated (R) Shoulder Abduction  10 reps x 2 sets  Hold for 2-3 seconds at end range    Standing (R) rhythmic stabilization; ball to wall 30 sec in each direction 4 lb ball Up/down  Lateral  Clockwise/counterclockwise     Standing Shoulder Rows 10 reps x 3 sets 20 lbs    Wall push up + 10 reps x 2 sets       Passive Stretching   Supine (R) shoulder flexion stretch 30 sec hold  3 reps

## 2023-10-12 ENCOUNTER — HOSPITAL ENCOUNTER (OUTPATIENT)
Dept: PHYSICAL THERAPY | Age: 78
Setting detail: THERAPIES SERIES
Discharge: HOME | End: 2023-10-12
Payer: MEDICARE

## 2023-10-12 PROCEDURE — 97110 THERAPEUTIC EXERCISES: CPT

## 2023-10-17 ENCOUNTER — HOSPITAL ENCOUNTER (OUTPATIENT)
Dept: PHYSICAL THERAPY | Age: 78
Setting detail: THERAPIES SERIES
Discharge: HOME | End: 2023-10-17
Payer: MEDICARE

## 2023-10-17 PROCEDURE — 97110 THERAPEUTIC EXERCISES: CPT

## 2023-10-17 NOTE — FLOWSHEET NOTE
[x] [facility]) [facility]  [address]  [place]: [phone]   F: [phone]    Physical Therapy Daily Treatment Note    Date:  10/17/23  Patient: Giuliana Guerrero  : 1945  MRN: [id number]  Physician: Oli Chu MD    Medical Diagnosis: Tear of right rotator cuff, unspecified tear extent, unspecified whether traumatic [M75.101]   Osteoarthritis of AC (acromioclavicular) joint [M19.019]   Subacromial impingement of right shoulder [M75.41]       Rehab Codes: (R) shoulder pain (M25.511) with limited motion (M25.611)  Onset date: 2023 - surgery date  Next Dr's appt.: 23  PT Visit Information  PT Insurance Information: Medical [place] Medicare Advantage  Total # of Visits Approved: 30  Total # of Visits to Date:   No Show: 0  Canceled Appointment: 0    Subjective  Patient stated that he continues to remain pain-free within the (R) shoulder. He stated that his active motion(s) continue to improve while performing his ADLs.      Pain:  [] Yes   [x] No      Location: right shoulder tightness   Pain Ratin/10    Objective  Modalities:  Precautions: Protocol given by Dr. Sudheer Willis Phase V - 21 weeks post op as of 10/12/2023   Exercises:  Exercise Reps/ Time Weight/ Level Comments   UBE Forward/Retro 2/2     Seated (R) Shoulder Flexion 10 rep     Seated (R) Shoulder Abduction  10 rep      Seated (R) Shoulder Scaption  10 rep     Standing Shoulder Shrugs  10 rep x 3 sets  10 lbs     Seated Bicep Curl  10 reps x 3 sets  8 lbs     Standing (R) Shoulder ER 10 reps x 3 sets Green band  With towel   Standing (R) Shoulder IR 10 reps x 3 sets  Green band With towel   Standing Horizontal Shoulder Abduction 10 reps x 3 sets Green band    Standing (R) rhythmic stabilization; ball to wall 30 sec in each direction 4 lb ball Up/down  Lateral  Clockwise/counterclockwise     Standing Shoulder Rows 10 reps x 3 sets 20 lbs    Wall push up + 10 reps x 3 sets

## 2023-10-20 ENCOUNTER — HOSPITAL ENCOUNTER (OUTPATIENT)
Dept: PHYSICAL THERAPY | Age: 78
Setting detail: THERAPIES SERIES
Discharge: HOME | End: 2023-10-20
Payer: MEDICARE

## 2023-10-20 PROCEDURE — 97110 THERAPEUTIC EXERCISES: CPT

## 2023-10-20 NOTE — FLOWSHEET NOTE
[x] [facility]) [facility]  [address]  [place]: [phone]   F: [phone]    Physical Therapy Daily Treatment Note    Date:  10/20/23  Patient: Chante Owen  : 1945  MRN: [id number]  Physician: Carl Downey MD    Medical Diagnosis: Tear of right rotator cuff, unspecified tear extent, unspecified whether traumatic [M75.101]   Osteoarthritis of AC (acromioclavicular) joint [M19.019]   Subacromial impingement of right shoulder [M75.41]       Rehab Codes: (R) shoulder pain (M25.511) with limited motion (M25.611)  Onset date: 2023 - surgery date  Next Dr's appt.: 23  PT Visit Information  PT Insurance Information: Medical [place] Medicare Advantage  Total # of Visits Approved: 30  Total # of Visits to Date: 29  No Show: 0  Canceled Appointment: 0    Subjective  Patient stated that he his symptoms are relatively unchanged from his previous treatment session.       Pain:  [] Yes   [x] No      Location:   Pain Ratin/10    Objective  Modalities:  Precautions: Protocol given by Dr. Xiomy Espinal Phase V - 22 weeks post op as of 10/19/2023   Exercises:  Exercise Reps/ Time Weight/ Level Comments   UBE Forward/Retro 2/2     Seated (R) Shoulder Flexion 10 rep     Seated (R) Shoulder Abduction  10 rep      Seated (R) Shoulder Scaption  10 rep     Standing Shoulder Shrugs  10 rep x 3 sets  10 lbs     Seated Bicep Curl  10 reps x 3 sets  8 lbs     Supine (R) shoulder protraction  10 reps x 3 sets  5 lbs     Supine (R) Triceps Curl 10 reps x 3 sets  3 lbs     Standing (R) Shoulder ER 10 reps x 3 sets Green band  With towel   Standing (R) Shoulder IR 10 reps x 3 sets  Green band With towel   Standing Horizontal Shoulder Abduction 10 reps x 3 sets Green band    Standing (R) rhythmic stabilization; ball to wall 30 sec in each direction 4 lb ball Up/down  Lateral  Clockwise/counterclockwise     Standing Shoulder Rows 10 reps x 3 sets 20 lbs    Wall push up

## 2023-10-23 ENCOUNTER — HOSPITAL ENCOUNTER (OUTPATIENT)
Dept: PHYSICAL THERAPY | Age: 78
Setting detail: THERAPIES SERIES
Discharge: HOME | End: 2023-10-23
Payer: MEDICARE

## 2023-10-23 PROCEDURE — 97110 THERAPEUTIC EXERCISES: CPT

## 2023-10-23 NOTE — PROGRESS NOTES
light touch at UE dermatomes    Reflexes    Not tested        Spine-- Cervical ROM  Motion ROM Comments   Cervical Flexion  WNL     Cervical Extension  0-45 Limited motion expected due to C2 fx and fused C3-C5   Cervical Side Bend  L: 0-35    R: 0-35 Limited motion expected due to C2 fx and fused C3-C5   Cervical Rotation  L: 0-55    R: 0-65 Limited motion expected due to C2 fx and fused C3-C5       Upper Extremity - Passive ROM   Motion  Right  Left  Comments    Shoulder Flexion   0-140 passively  WNL    Shoulder Abduction  WNL  WNL    Shoulder Extension  WNL WNL    Shoulder IR  40 @ 90  WNL    Shoulder ER 70 @ 90  WNL    Elbow Extension  WNL WNL    Elbow Flexion  WNL WNL    Pronation  WNL WNL    Supination  WNL WNL    Wrist Flexion  WNL WNL    Wrist Extension  WNL WNL    Upper Extremity - Strength  Motion  Right  Left  Comments    Shoulder Flexion  3- 5    Shoulder Abduction  3 5    Shoulder Extension  5 5    Shoulder IR  Functionally spinous processes of T12 - L1 5    Shoulder ER Functionally WNL  5       Functional Assessment Used: Quick DASH  Current Status: 11.36% (Met)  Goal Status: </= 30% disability     Assessment  Patient's self-reported pain levels were relatively unchanged compared to his first prescription. However, he stated that he is now able to perform all of his desired ADLs with less and difficulty. Tightness was still apparent within the (R) pect major at 120 degrees, (B) upper traps and (B) levator scapulae. Passive motion(s) of the (R) shoulder were improved but limitations were still present within the sagittal and transverse plane(s). Strength improvements were shown throughout the (R) GH joint  but weakness was present throughout all three planes of motion. Activity limitations were improved based on the clinically significant improved percentage self-reported on the Quick DASH outcome measure.      Goals  LTG: (to be met in 30 treatments)  Patient will demonstrate improved (R) shoulder ROM

## 2023-10-27 ENCOUNTER — APPOINTMENT (OUTPATIENT)
Dept: PHYSICAL THERAPY | Age: 78
End: 2023-10-27
Payer: MEDICARE

## 2023-11-02 ENCOUNTER — OFFICE VISIT (OUTPATIENT)
Age: 78
End: 2023-11-02

## 2023-11-02 VITALS — BODY MASS INDEX: 19.4 KG/M2 | WEIGHT: 128 LBS | HEIGHT: 68 IN

## 2023-11-02 DIAGNOSIS — M75.121 COMPLETE TEAR OF RIGHT ROTATOR CUFF, UNSPECIFIED WHETHER TRAUMATIC: Primary | ICD-10-CM

## 2023-11-02 NOTE — PROGRESS NOTES
[place]  [facility]  [address] [place] [address] [address]  [place] [address]  Dept: [phone]  Dept Fax: [phone]    Chief Compliant:  Chief Complaint   Patient presents with    Shoulder Pain     Right shoulder pain had surgery 5/17/23        History of Present Illness: This is a pleasant 70 y.o. male who is now 6-month status post right shoulder rotator cuff repair with a double row repair, distal clavicle excision, subacromial decompression. He had a therapy visit a few weeks ago that seemed particularly difficult for him and he had some pain since then and he wanted to have his shoulder evaluated. Physical Exam: The right shoulder has near full range of motion in all planes. He has a little bit of difficulty in the scapular plane but he is able to fully elevate his arm overhead. He has good internal and external rotation. Imaging: None      Assessment and Plan: This is a pleasant 70 y.o. male who is status post the above. I think he is actually doing quite well. I think he can work on continued range of motion and strengthening on his own. I did explain that he did have quite soft bone during his procedure and I am not surprised that he may take longer to heal.  He can increase his activity as tolerated and follow-up as needed.          Past History:    Current Outpatient Medications:     alfuzosin (UROXATRAL) 10 MG extended release tablet, Take 1 tablet by mouth daily, Disp: 90 tablet, Rfl: 3  Allergies   Allergen Reactions    No Known Allergies     Seasonal      Social History     Socioeconomic History    Marital status: Single     Spouse name: Not on file    Number of children: Not on file    Years of education: Not on file    Highest education level: Not on file   Occupational History    Not on file   Tobacco Use    Smoking status: Never    Smokeless tobacco: Never

## 2023-12-28 ENCOUNTER — TELEPHONE (OUTPATIENT)
Dept: INTERNAL MEDICINE CLINIC | Age: 78
End: 2023-12-28

## 2023-12-29 ENCOUNTER — OFFICE VISIT (OUTPATIENT)
Dept: FAMILY MEDICINE CLINIC | Age: 78
End: 2023-12-29
Payer: MEDICARE

## 2023-12-29 VITALS
HEART RATE: 98 BPM | SYSTOLIC BLOOD PRESSURE: 132 MMHG | TEMPERATURE: 98.4 F | OXYGEN SATURATION: 96 % | DIASTOLIC BLOOD PRESSURE: 72 MMHG

## 2023-12-29 DIAGNOSIS — J43.9 HISTORY OF EMPHYSEMA (HCC): ICD-10-CM

## 2023-12-29 DIAGNOSIS — R05.1 ACUTE COUGH: ICD-10-CM

## 2023-12-29 DIAGNOSIS — R09.82 PND (POST-NASAL DRIP): Primary | ICD-10-CM

## 2023-12-29 PROCEDURE — 1123F ACP DISCUSS/DSCN MKR DOCD: CPT | Performed by: NURSE PRACTITIONER

## 2023-12-29 PROCEDURE — 99213 OFFICE O/P EST LOW 20 MIN: CPT | Performed by: NURSE PRACTITIONER

## 2023-12-29 RX ORDER — AZITHROMYCIN 250 MG/1
250 TABLET, FILM COATED ORAL SEE ADMIN INSTRUCTIONS
Qty: 6 TABLET | Refills: 0 | Status: SHIPPED | OUTPATIENT
Start: 2023-12-29 | End: 2024-01-03

## 2023-12-29 RX ORDER — FLUTICASONE PROPIONATE 50 MCG
2 SPRAY, SUSPENSION (ML) NASAL DAILY
Qty: 16 G | Refills: 0 | Status: SHIPPED | OUTPATIENT
Start: 2023-12-29

## 2024-01-08 ENCOUNTER — HOSPITAL ENCOUNTER (OUTPATIENT)
Age: 79
Setting detail: SPECIMEN
Discharge: HOME | End: 2024-01-08

## 2024-01-08 DIAGNOSIS — R97.20 ELEVATED PSA: ICD-10-CM

## 2024-01-08 LAB — PSA SERPL-MCNC: 2.68 NG/ML

## 2024-01-29 ENCOUNTER — OFFICE VISIT (OUTPATIENT)
Age: 79
End: 2024-01-29
Payer: MEDICARE

## 2024-01-29 VITALS — HEIGHT: 68 IN | BODY MASS INDEX: 19.4 KG/M2 | WEIGHT: 128 LBS

## 2024-01-29 DIAGNOSIS — N20.0 BILATERAL RENAL STONES: ICD-10-CM

## 2024-01-29 DIAGNOSIS — Z87.898 HISTORY OF ELEVATED PSA: ICD-10-CM

## 2024-01-29 DIAGNOSIS — R35.1 NOCTURIA: ICD-10-CM

## 2024-01-29 DIAGNOSIS — N13.8 BPH WITH OBSTRUCTION/LOWER URINARY TRACT SYMPTOMS: Primary | ICD-10-CM

## 2024-01-29 DIAGNOSIS — N40.1 BPH WITH OBSTRUCTION/LOWER URINARY TRACT SYMPTOMS: Primary | ICD-10-CM

## 2024-01-29 LAB
BILIRUBIN, POC: NORMAL
BLOOD URINE, POC: NORMAL
CLARITY, POC: CLEAR
COLOR, POC: NORMAL
GLUCOSE URINE, POC: NORMAL
KETONES, POC: NORMAL
LEUKOCYTE EST, POC: NORMAL
NITRITE, POC: NORMAL
PH, POC: NORMAL
PROTEIN, POC: NORMAL
SPECIFIC GRAVITY, POC: NORMAL
UROBILINOGEN, POC: NORMAL

## 2024-01-29 PROCEDURE — 99214 OFFICE O/P EST MOD 30 MIN: CPT | Performed by: SPECIALIST

## 2024-01-29 PROCEDURE — 81003 URINALYSIS AUTO W/O SCOPE: CPT | Performed by: SPECIALIST

## 2024-01-29 PROCEDURE — 1123F ACP DISCUSS/DSCN MKR DOCD: CPT | Performed by: SPECIALIST

## 2024-01-29 NOTE — PROGRESS NOTES
Nimesh Redmond MD FACS    [facility] Urology Office Progress Note    Patient:  Donaldo Lopez  YOB: 1945  Date: 1/29/2024    HISTORY OF PRESENT ILLNESS:   The patient is a 78 y.o. male  Patient's lower urinary tract symptoms are stable on Alfuzosin 10 mg po qd for BPH symptoms.   No flank pain or gross hematuria to suggest recurrent kidney stones.   Will check a KUB in one year.   Patient's previously elevated PSA is now normal and he no longer has microhematuria.  Lower urinary tract symptoms: decreased urinary stream and nocturia, 1 times per night   Last AUA Symptom Score (QOL): 5 (1)  Today's AUA Symptom Score (QOL): 3 (1)    Summary of old records:   Elevated PSA of 10.43 on 4/7/23; 3/27/23 E coli UTI; Rx 4 weeks of Bactrim; repeat PSA in 3 months  3/27/23 E coli UTI; PVR = 0 mL  Very large prostate, BPH on 4/18/23 CT: begin Alfuzosin 10 mg po qd 4/20/23, bid 6/5/23 (did not help)  5/11/23 VD: IWL=504 mL, QXF=7623 mL/d, TV=11.67 voids/d, YSG=350 mL/void, Nx4.67, NPi=52%, NUP=97 mL/hr   Punctate bilateral kidney stones on 4/18/23 CT  Small microhematuria on 4/20/23 UA    Additional History: none    Procedures Today: N/A    Urinalysis today:  Results for POC orders placed in visit on 01/29/24   POCT Urinalysis No Micro (Auto)   Result Value Ref Range    Color, UA matt     Clarity, UA clear     Glucose, UA POC neg     Bilirubin, UA      Ketones, UA      Spec Grav, UA      Blood, UA POC neg     pH, UA      Protein, UA POC neg     Urobilinogen, UA      Leukocytes, UA neg     Nitrite, UA neg        Last several PSA's:  Lab Results   Component Value Date    PSA 2.68 01/08/2024    PSA 4.86 (H) 07/27/2023    PSA 10.43 (H) 04/07/2023       Last total testosterone:  No results found for: \"TESTOSTERONE\"    Last BUN and creatinine:  Lab Results   Component Value Date    BUN 24 (H) 04/07/2023     Lab Results   Component Value Date    CREATININE 1.07 04/07/2023       Last CBC:  Lab Results

## 2024-03-19 ENCOUNTER — OFFICE VISIT (OUTPATIENT)
Dept: INTERNAL MEDICINE CLINIC | Age: 79
End: 2024-03-19
Payer: MEDICARE

## 2024-03-19 VITALS — HEIGHT: 68 IN | BODY MASS INDEX: 19.43 KG/M2 | WEIGHT: 128.2 LBS

## 2024-03-19 DIAGNOSIS — L23.7 POISON IVY: Primary | ICD-10-CM

## 2024-03-19 DIAGNOSIS — J43.9 HISTORY OF EMPHYSEMA (HCC): ICD-10-CM

## 2024-03-19 PROCEDURE — 1123F ACP DISCUSS/DSCN MKR DOCD: CPT | Performed by: INTERNAL MEDICINE

## 2024-03-19 PROCEDURE — 99213 OFFICE O/P EST LOW 20 MIN: CPT | Performed by: INTERNAL MEDICINE

## 2024-03-19 RX ORDER — HYDROXYZINE HYDROCHLORIDE 25 MG/1
25 TABLET, FILM COATED ORAL EVERY 12 HOURS PRN
Qty: 20 TABLET | Refills: 0 | Status: SHIPPED | OUTPATIENT
Start: 2024-03-19 | End: 2024-03-29

## 2024-03-19 RX ORDER — CLOBETASOL PROPIONATE 0.5 MG/G
OINTMENT TOPICAL
Qty: 1 EACH | Refills: 3 | Status: SHIPPED | OUTPATIENT
Start: 2024-03-19

## 2024-03-19 ASSESSMENT — PATIENT HEALTH QUESTIONNAIRE - PHQ9
SUM OF ALL RESPONSES TO PHQ QUESTIONS 1-9: 0
SUM OF ALL RESPONSES TO PHQ QUESTIONS 1-9: 0
SUM OF ALL RESPONSES TO PHQ9 QUESTIONS 1 & 2: 0
SUM OF ALL RESPONSES TO PHQ QUESTIONS 1-9: 0
SUM OF ALL RESPONSES TO PHQ QUESTIONS 1-9: 0
1. LITTLE INTEREST OR PLEASURE IN DOING THINGS: NOT AT ALL
2. FEELING DOWN, DEPRESSED OR HOPELESS: NOT AT ALL

## 2024-03-20 NOTE — PROGRESS NOTES
Visit Information    Have you changed or started any medications since your last visit including any over-the-counter medicines, vitamins, or herbal medicines? no   Are you having any side effects from any of your medications? -  no  Have you stopped taking any of your medications? Is so, why? -  no    Have you seen any other physician or provider since your last visit? No  Have you had any other diagnostic tests since your last visit? No  Have you been seen in the emergency room and/or had an admission to a hospital since we last saw you? No  Have you had your routine dental cleaning in the past 6 months? no    Have you activated your [employer] account? If not, what are your barriers? Yes     Patient Care Team:  Nina Angeles APRN - CNP as PCP - General (Internal Medicine)  Nina Angeles APRN - CNP as PCP - Empaneled Provider  Nimesh Redmond MD as Consulting Physician (Urology)    Medical History Review  Past Medical, Family, and Social History reviewed and does not contribute to the patient presenting condition    Health Maintenance   Topic Date Due    COVID-19 Vaccine (1) Never done    Shingles vaccine (1 of 2) Never done    Respiratory Syncytial Virus (RSV) Pregnant or age 60 yrs+ (1 - 1-dose 60+ series) Never done    Flu vaccine (1) 08/01/2023    Annual Wellness Visit (Medicare Advantage)  01/01/2024    Depression Screen  09/14/2024    DTaP/Tdap/Td vaccine (2 - Td or Tdap) 03/15/2031    Pneumococcal 65+ years Vaccine  Completed    Hepatitis C screen  Completed    Hepatitis A vaccine  Aged Out    Hepatitis B vaccine  Aged Out    Hib vaccine  Aged Out    Polio vaccine  Aged Out    Meningococcal (ACWY) vaccine  Aged Out    Lipids  Discontinued    Colorectal Cancer Screen  Discontinued    Prostate Specific Antigen (PSA) Screening or Monitoring  Discontinued       
fluticasone (FLONASE) 50 MCG/ACT nasal spray 2 sprays by Each Nostril route daily 16 g 0    alfuzosin (UROXATRAL) 10 MG extended release tablet Take 1 tablet by mouth daily 90 tablet 3     No current facility-administered medications for this visit.       SOCIAL HISTORY    Reviewed and no change from previous record. Donaldo  reports that he has never smoked. He has never used smokeless tobacco.    FAMILY HISTORY:    Reviewed and No change from previous visit  family history is not on file.    OF SYSTEMS:    General : Negative for fatigue, weight loss, appetite change  HEENT : Negative for nasal congestion, sneezing, runny nose, sinus pain  Respiratory : Negative for shortness of breath cough, congestion, wheezing  Cardiovascular: Negative for chest pain, palpitations, shortness of breath  GI: Negative for abdominal pain, nausea, vomiting, diarrhea, constipation  Genitourinary : negative for dysuria, urinary frequency, urinary urgency, hematuria  Neurological : Negative for headache, dizziness, gait change,   Psych : Negative for depression, anxiety  Skin:  RASH Musculoskeletal : Negative for joint pain, muscle pain      PHYSICAL EXAM:      Vitals:    03/19/24 1635   Weight: 58.2 kg (128 lb 3.2 oz)   Height: 1.727 m (5' 8\")     BP Readings from Last 3 Encounters:   12/29/23 132/72   09/14/23 120/70   09/05/23 139/79        Physical Exam  HENT:      Head: Normocephalic.      Nose: Nose normal.      Mouth/Throat:      Mouth: Mucous membranes are moist.   Eyes:      Extraocular Movements: Extraocular movements intact.      Pupils: Pupils are equal, round, and reactive to light.   Cardiovascular:      Rate and Rhythm: Normal rate and regular rhythm.      Pulses: Normal pulses.   Pulmonary:      Effort: Pulmonary effort is normal.      Breath sounds: Normal breath sounds.   Abdominal:      Palpations: Abdomen is soft.   Musculoskeletal:         General: Normal range of motion.      Cervical back: Normal range of motion.

## 2024-03-22 ENCOUNTER — CARE COORDINATION (OUTPATIENT)
Dept: CARE COORDINATION | Age: 79
End: 2024-03-22

## 2024-03-22 NOTE — CARE COORDINATION
PCP referral, introduction letter mailed.   GUILHERME ThomasN, RN ambulatory care manager [phone].

## 2024-03-28 ENCOUNTER — CARE COORDINATION (OUTPATIENT)
Dept: CARE COORDINATION | Age: 79
End: 2024-03-28

## 2024-03-28 NOTE — CARE COORDINATION
PCP referral  Explained care coordination and patient declined stating he is doing well at this time and does not have any needs. Encouraged patient to keep my contact number for any needs in the future.   Nancy Mix, BSN, RN ambulatory care manager [phone].  Patient did report that his poison ivy has cleared up.

## 2024-05-22 ENCOUNTER — TELEPHONE (OUTPATIENT)
Dept: INTERNAL MEDICINE CLINIC | Age: 79
End: 2024-05-22

## 2024-10-03 ENCOUNTER — OFFICE VISIT (OUTPATIENT)
Dept: INTERNAL MEDICINE CLINIC | Age: 79
End: 2024-10-03

## 2024-10-03 VITALS
HEART RATE: 102 BPM | OXYGEN SATURATION: 97 % | SYSTOLIC BLOOD PRESSURE: 130 MMHG | WEIGHT: 124 LBS | HEIGHT: 68 IN | BODY MASS INDEX: 18.79 KG/M2 | DIASTOLIC BLOOD PRESSURE: 78 MMHG

## 2024-10-03 DIAGNOSIS — E55.9 VITAMIN D DEFICIENCY: ICD-10-CM

## 2024-10-03 DIAGNOSIS — E78.5 HYPERLIPIDEMIA, UNSPECIFIED HYPERLIPIDEMIA TYPE: ICD-10-CM

## 2024-10-03 DIAGNOSIS — R53.83 FATIGUE, UNSPECIFIED TYPE: ICD-10-CM

## 2024-10-03 DIAGNOSIS — R86.8 DISCOLORED SEMEN: ICD-10-CM

## 2024-10-03 DIAGNOSIS — Z00.00 MEDICARE ANNUAL WELLNESS VISIT, SUBSEQUENT: Primary | ICD-10-CM

## 2024-10-03 DIAGNOSIS — Z12.5 PROSTATE CANCER SCREENING: ICD-10-CM

## 2024-10-03 SDOH — ECONOMIC STABILITY: FOOD INSECURITY: WITHIN THE PAST 12 MONTHS, YOU WORRIED THAT YOUR FOOD WOULD RUN OUT BEFORE YOU GOT MONEY TO BUY MORE.: NEVER TRUE

## 2024-10-03 SDOH — ECONOMIC STABILITY: FOOD INSECURITY: WITHIN THE PAST 12 MONTHS, THE FOOD YOU BOUGHT JUST DIDN'T LAST AND YOU DIDN'T HAVE MONEY TO GET MORE.: NEVER TRUE

## 2024-10-03 SDOH — ECONOMIC STABILITY: INCOME INSECURITY: HOW HARD IS IT FOR YOU TO PAY FOR THE VERY BASICS LIKE FOOD, HOUSING, MEDICAL CARE, AND HEATING?: NOT HARD AT ALL

## 2024-10-03 ASSESSMENT — PATIENT HEALTH QUESTIONNAIRE - PHQ9
SUM OF ALL RESPONSES TO PHQ QUESTIONS 1-9: 0
2. FEELING DOWN, DEPRESSED OR HOPELESS: NOT AT ALL
SUM OF ALL RESPONSES TO PHQ QUESTIONS 1-9: 0
1. LITTLE INTEREST OR PLEASURE IN DOING THINGS: NOT AT ALL
SUM OF ALL RESPONSES TO PHQ QUESTIONS 1-9: 0
SUM OF ALL RESPONSES TO PHQ QUESTIONS 1-9: 0
SUM OF ALL RESPONSES TO PHQ9 QUESTIONS 1 & 2: 0

## 2024-10-03 NOTE — PATIENT INSTRUCTIONS

## 2024-10-03 NOTE — PROGRESS NOTES
specialist      Advanced Directives:  Do you have a Living Will?: (!) No    Intervention:  has NO advanced directive  - referred to ACP Coordinator                              Objective   Vitals:    10/03/24 1109   BP: 130/78   Site: Left Upper Arm   Pulse: (!) 102   SpO2: 97%   Weight: 56.2 kg (124 lb)   Height: 1.727 m (5' 8\")      Body mass index is 18.85 kg/m².        General Appearance: alert and oriented to person, place and time, well developed and thin, in no acute distress  Skin: warm and dry, no rash or erythema  Head: normocephalic and atraumatic  Eyes: pupils equal, round, and reactive to light, extraocular eye movements intact, conjunctivae normal  ENT: tympanic membrane, external ear and ear canal normal bilaterally, nose without deformity, nasal mucosa and turbinates normal without polyps  Neck: supple and non-tender without mass, no thyromegaly or thyroid nodules, no cervical lymphadenopathy  Pulmonary/Chest: clear to auscultation bilaterally- no wheezes, rales or rhonchi, normal air movement, no respiratory distress  Cardiovascular: normal rate, regular rhythm, normal S1 and S2, no murmurs, rubs, clicks, or gallops, distal pulses intact, no carotid bruits  Abdomen: soft, non-tender, non-distended, normal bowel sounds, no masses or organomegaly  Extremities: no cyanosis, clubbing or edema  Musculoskeletal: normal range of motion, no joint swelling, deformity or tenderness  Neurologic: reflexes normal and symmetric, no cranial nerve deficit, gait, coordination and speech normal            Allergies   Allergen Reactions    No Known Allergies     Seasonal      Prior to Visit Medications    Medication Sig Taking? Authorizing Provider   alfuzosin (UROXATRAL) 10 MG extended release tablet take 1 tablet by mouth once daily Yes Nimesh Redmond MD       [facility] (Including outside providers/suppliers regularly involved in providing care):   Patient Care Team:  Nina Angeles APRN - NANCY as PCP -

## 2024-10-04 ENCOUNTER — CLINICAL DOCUMENTATION (OUTPATIENT)
Dept: SPIRITUAL SERVICES | Age: 79
End: 2024-10-04

## 2024-10-04 NOTE — ACP (ADVANCE CARE PLANNING)
Advance Care Planning   Ambulatory ACP Specialist Patient Outreach    Date:  10/4/2024    ACP Specialist:  Flory Weiss    Outreach call to patient in follow-up to ACP Specialist referral from:Nina Angeles APRN - CNP    [x] PCP  [] Provider   [] Ambulatory Care Management [] Other     For:                  [x] Advance Directive Assistance              [] Complete Portable DNR order              [] Complete POST/POLST/MOST              [x] Code Status Discussion             [] Discuss Goals of Care             [x] Early ACP Decision-Making              [] Other (Specify)    Date Referral Received: 10/3/2024    Next Step:   [] ACP scheduled conversation  [x] Outreach again in one week               [x] Email / Mail ACP Info Sheets  [x] Email / Mail Advance Directive   [] Closing referral.  Routing closure to referring provider/staff and to ACP Specialist .    [] Closure letter mailed to patient with invitation to contact ACP Specialist if / when ready.   [] Other (Specify here):       [x] At this time, Healthcare Decision Maker Is:         Primary Decision Maker: Cowden,Tori - Child - [phone]    Primary Decision Maker: Bee Lopez - Child - [phone]    Primary Decision Maker: Nicolasa Lopez - Child - [phone]      [] Primary agent named in scanned advance directive.    [x] Legal Next of Kin.     [] Unable to determine legal decision maker at this time.    Outreaches:       [x] 1st -  Date:  10/4/2024               Intervention:  [] Spoke with Patient   [x] Left Voice mail [x] Email / Mail    [] MyChart  [] Other (Specify) :     Outcomes: Writer attempted ACP outreach to both - patient's home ([phone]),  mobile ([phone]), and patient's daughter ([phone]) - no answer.  Writer left voicemail on all lines requesting return call including call back number.  ACP outreach sent to patient's email address on file with a copy of [place] [facility] forms and ACP information sheets \"What

## 2024-11-12 ENCOUNTER — HOSPITAL ENCOUNTER (OUTPATIENT)
Age: 79
Setting detail: SPECIMEN
Discharge: HOME | End: 2024-11-12

## 2024-11-12 DIAGNOSIS — Z12.5 PROSTATE CANCER SCREENING: ICD-10-CM

## 2024-11-12 DIAGNOSIS — E55.9 VITAMIN D DEFICIENCY: ICD-10-CM

## 2024-11-12 DIAGNOSIS — R53.83 FATIGUE, UNSPECIFIED TYPE: ICD-10-CM

## 2024-11-12 DIAGNOSIS — E78.5 HYPERLIPIDEMIA, UNSPECIFIED HYPERLIPIDEMIA TYPE: ICD-10-CM

## 2024-11-12 DIAGNOSIS — R86.8 DISCOLORED SEMEN: ICD-10-CM

## 2024-11-12 LAB
25(OH)D3 SERPL-MCNC: 29.5 NG/ML (ref 30–100)
ALBUMIN SERPL-MCNC: 4.3 G/DL (ref 3.5–5.2)
ALBUMIN/GLOB SERPL: 1.5 {RATIO} (ref 1–2.5)
ALP SERPL-CCNC: 69 U/L (ref 40–129)
ALT SERPL-CCNC: 13 U/L (ref 10–50)
ANION GAP SERPL CALCULATED.3IONS-SCNC: 10 MMOL/L (ref 9–16)
AST SERPL-CCNC: 21 U/L (ref 10–50)
BASOPHILS # BLD: 0.05 K/UL (ref 0–0.2)
BASOPHILS NFR BLD: 1 % (ref 0–2)
BILIRUB SERPL-MCNC: 0.4 MG/DL (ref 0–1.2)
BILIRUB UR QL STRIP: NEGATIVE
BUN SERPL-MCNC: 22 MG/DL (ref 8–23)
CALCIUM SERPL-MCNC: 9.5 MG/DL (ref 8.6–10.4)
CHLORIDE SERPL-SCNC: 105 MMOL/L (ref 98–107)
CHOLEST SERPL-MCNC: 203 MG/DL (ref 0–199)
CHOLESTEROL/HDL RATIO: 4.7
CLARITY UR: CLEAR
CO2 SERPL-SCNC: 26 MMOL/L (ref 20–31)
COLOR UR: YELLOW
COMMENT: NORMAL
CREAT SERPL-MCNC: 1.1 MG/DL (ref 0.7–1.2)
EOSINOPHIL # BLD: 0.25 K/UL (ref 0–0.44)
EOSINOPHILS RELATIVE PERCENT: 3 % (ref 1–4)
ERYTHROCYTE [DISTWIDTH] IN BLOOD BY AUTOMATED COUNT: 13.4 % (ref 11.8–14.4)
GFR, ESTIMATED: 69 ML/MIN/1.73M2
GLUCOSE SERPL-MCNC: 93 MG/DL (ref 74–99)
GLUCOSE UR STRIP-MCNC: NEGATIVE MG/DL
HCT VFR BLD AUTO: 41.6 % (ref 40.7–50.3)
HDLC SERPL-MCNC: 43 MG/DL
HGB BLD-MCNC: 13.1 G/DL (ref 13–17)
HGB UR QL STRIP.AUTO: NEGATIVE
IMM GRANULOCYTES # BLD AUTO: <0.03 K/UL (ref 0–0.3)
IMM GRANULOCYTES NFR BLD: 0 %
KETONES UR STRIP-MCNC: NEGATIVE MG/DL
LDLC SERPL CALC-MCNC: 139 MG/DL (ref 0–100)
LEUKOCYTE ESTERASE UR QL STRIP: NEGATIVE
LYMPHOCYTES NFR BLD: 1.85 K/UL (ref 1.1–3.7)
LYMPHOCYTES RELATIVE PERCENT: 26 % (ref 24–43)
MCH RBC QN AUTO: 29.5 PG (ref 25.2–33.5)
MCHC RBC AUTO-ENTMCNC: 31.5 G/DL (ref 28.4–34.8)
MCV RBC AUTO: 93.7 FL (ref 82.6–102.9)
MONOCYTES NFR BLD: 0.94 K/UL (ref 0.1–1.2)
MONOCYTES NFR BLD: 13 % (ref 3–12)
NEUTROPHILS NFR BLD: 57 % (ref 36–65)
NEUTS SEG NFR BLD: 4.14 K/UL (ref 1.5–8.1)
NITRITE UR QL STRIP: NEGATIVE
NRBC BLD-RTO: 0 PER 100 WBC
PH UR STRIP: 5.5 [PH] (ref 5–8)
PLATELET # BLD AUTO: 163 K/UL (ref 138–453)
PMV BLD AUTO: 9.8 FL (ref 8.1–13.5)
POTASSIUM SERPL-SCNC: 4.6 MMOL/L (ref 3.7–5.3)
PROT SERPL-MCNC: 7.1 G/DL (ref 6.6–8.7)
PROT UR STRIP-MCNC: NEGATIVE MG/DL
PSA SERPL-MCNC: 2 NG/ML (ref 0–4)
RBC # BLD AUTO: 4.44 M/UL (ref 4.21–5.77)
SODIUM SERPL-SCNC: 141 MMOL/L (ref 136–145)
SP GR UR STRIP: 1.02 (ref 1–1.03)
TRIGL SERPL-MCNC: 104 MG/DL
TSH SERPL DL<=0.05 MIU/L-ACNC: 2.58 UIU/ML (ref 0.27–4.2)
UROBILINOGEN UR STRIP-ACNC: NORMAL EU/DL (ref 0–1)
VLDLC SERPL CALC-MCNC: 21 MG/DL (ref 1–30)
WBC OTHER # BLD: 7.3 K/UL (ref 3.5–11.3)

## 2025-03-21 ENCOUNTER — TELEPHONE (OUTPATIENT)
Dept: INTERNAL MEDICINE CLINIC | Age: 80
End: 2025-03-21

## 2025-04-29 ENCOUNTER — PATIENT MESSAGE (OUTPATIENT)
Dept: INTERNAL MEDICINE CLINIC | Age: 80
End: 2025-04-29

## 2025-06-09 DIAGNOSIS — N40.1 BPH WITH OBSTRUCTION/LOWER URINARY TRACT SYMPTOMS: ICD-10-CM

## 2025-06-09 DIAGNOSIS — N13.8 BPH WITH OBSTRUCTION/LOWER URINARY TRACT SYMPTOMS: ICD-10-CM

## 2025-06-09 DIAGNOSIS — N20.0 BILATERAL RENAL STONES: Primary | ICD-10-CM

## 2025-06-09 DIAGNOSIS — Z87.898 HISTORY OF ELEVATED PSA: Primary | ICD-10-CM

## 2025-06-10 ENCOUNTER — HOSPITAL ENCOUNTER (OUTPATIENT)
Dept: GENERAL RADIOLOGY | Facility: CLINIC | Age: 80
Discharge: HOME | End: 2025-06-12
Payer: MEDICARE

## 2025-06-10 ENCOUNTER — HOSPITAL ENCOUNTER (OUTPATIENT)
Age: 80
Setting detail: SPECIMEN
Discharge: HOME | End: 2025-06-10

## 2025-06-10 DIAGNOSIS — N13.8 BPH WITH OBSTRUCTION/LOWER URINARY TRACT SYMPTOMS: ICD-10-CM

## 2025-06-10 DIAGNOSIS — N20.0 BILATERAL RENAL STONES: ICD-10-CM

## 2025-06-10 DIAGNOSIS — Z87.898 HISTORY OF ELEVATED PSA: ICD-10-CM

## 2025-06-10 DIAGNOSIS — N40.1 BPH WITH OBSTRUCTION/LOWER URINARY TRACT SYMPTOMS: ICD-10-CM

## 2025-06-10 LAB — PSA SERPL-MCNC: 2.12 NG/ML (ref 0–4)

## 2025-06-10 PROCEDURE — 74018 RADEX ABDOMEN 1 VIEW: CPT

## 2025-06-16 ENCOUNTER — OFFICE VISIT (OUTPATIENT)
Age: 80
End: 2025-06-16
Payer: MEDICARE

## 2025-06-16 VITALS — WEIGHT: 124 LBS | BODY MASS INDEX: 18.79 KG/M2 | HEIGHT: 68 IN

## 2025-06-16 DIAGNOSIS — N13.8 BPH WITH OBSTRUCTION/LOWER URINARY TRACT SYMPTOMS: Primary | ICD-10-CM

## 2025-06-16 DIAGNOSIS — N40.1 BPH WITH OBSTRUCTION/LOWER URINARY TRACT SYMPTOMS: Primary | ICD-10-CM

## 2025-06-16 DIAGNOSIS — N20.0 LEFT RENAL STONE: ICD-10-CM

## 2025-06-16 DIAGNOSIS — R35.1 NOCTURIA: ICD-10-CM

## 2025-06-16 DIAGNOSIS — Z87.898 HISTORY OF ELEVATED PSA: ICD-10-CM

## 2025-06-16 LAB
BILIRUBIN, POC: NORMAL
BLOOD URINE, POC: NORMAL
CLARITY, POC: CLEAR
COLOR, POC: YELLOW
GLUCOSE URINE, POC: NORMAL MG/DL
KETONES, POC: NORMAL
LEUKOCYTE EST, POC: NORMAL
NITRITE, POC: NORMAL
PH, POC: NORMAL
PROTEIN, POC: NORMAL MG/DL
SPECIFIC GRAVITY, POC: NORMAL
UROBILINOGEN, POC: NORMAL

## 2025-06-16 PROCEDURE — 81003 URINALYSIS AUTO W/O SCOPE: CPT | Performed by: SPECIALIST

## 2025-06-16 PROCEDURE — 1123F ACP DISCUSS/DSCN MKR DOCD: CPT | Performed by: SPECIALIST

## 2025-06-16 PROCEDURE — 1159F MED LIST DOCD IN RCRD: CPT | Performed by: SPECIALIST

## 2025-06-16 PROCEDURE — 99214 OFFICE O/P EST MOD 30 MIN: CPT | Performed by: SPECIALIST

## 2025-06-16 NOTE — PROGRESS NOTES
Nimesh Redmond MD [facility] Urology Office Progress Note    Patient:  Donaldo Lopez  YOB: 1945  Date: 6/16/2025    HISTORY OF PRESENT ILLNESS:   The patient is a 79 y.o. male  His lower urinary tract symptoms (\"BPH\") are not bothersome enough to warrant medical Rx.    No flank pain or gross hematuria to suggest recurrent kidney stones.   Patient's 6/10/25 KUB shows a 3 mm non-obstructing Left kidney stone; no Rx required.  Lower urinary tract symptoms: urgency, frequency, hesitancy, decreased urinary stream, and nocturia, 1 times per night   Last AUA Symptom Score (QOL): 3 (1)  Today's AUA Symptom Score (QOL): 7 (1)    Summary of old records:   Elevated PSA of 10.43 on 4/7/23; 3/27/23 E coli UTI; Rx 4 weeks of Bactrim; repeat PSA in 3 months  3/27/23 E coli UTI; PVR = 0 mL  Very large prostate, BPH on 4/18/23 CT: Alfuzosin 10 mg po qd 4/20/23, bid 6/5/23 (did not help)  5/11/23 VD: EJA=595 mL, DLT=0640 mL/d, TV=11.67 voids/d, VCN=125 mL/void, Nx4.67, NPi=52%, NUP=97 mL/hr   Punctate bilateral kidney stones on 4/18/23 CT  Small microhematuria on 4/20/23 UA    Additional History: none    Procedures Today: N/A    Urinalysis today:  Results for orders placed or performed in visit on 06/16/25   POCT Urinalysis No Micro (Auto)   Result Value Ref Range    Color, UA yellow     Clarity, UA clear     Glucose, UA POC neg mg/dL    Bilirubin, UA      Ketones, UA      Spec Grav, UA      Blood, UA POC neg     pH, UA      Protein, UA POC neg mg/dL    Urobilinogen, UA      Leukocytes, UA neg     Nitrite, UA neg        Last several PSA's:  Lab Results   Component Value Date    PSA 2.12 06/10/2025    PSA 2.00 11/12/2024    PSA 2.68 01/08/2024       Last total testosterone:  No results found for: \"TESTOSTERONE\"    Last BUN and creatinine:  Lab Results   Component Value Date    BUN 22 11/12/2024     Lab Results   Component Value Date    CREATININE 1.1 11/12/2024       Last CBC:  Lab Results  Isotretinoin Pregnancy And Lactation Text: This medication is Pregnancy Category X and is considered extremely dangerous during pregnancy. It is unknown if it is excreted in breast milk.

## 2025-08-05 ENCOUNTER — OFFICE VISIT (OUTPATIENT)
Dept: INTERNAL MEDICINE CLINIC | Age: 80
End: 2025-08-05
Payer: MEDICARE

## 2025-08-05 VITALS
SYSTOLIC BLOOD PRESSURE: 122 MMHG | DIASTOLIC BLOOD PRESSURE: 80 MMHG | HEIGHT: 68 IN | BODY MASS INDEX: 18.64 KG/M2 | OXYGEN SATURATION: 98 % | HEART RATE: 68 BPM | WEIGHT: 123 LBS

## 2025-08-05 DIAGNOSIS — J30.2 SEASONAL ALLERGIES: ICD-10-CM

## 2025-08-05 DIAGNOSIS — E78.5 HYPERLIPIDEMIA, UNSPECIFIED HYPERLIPIDEMIA TYPE: ICD-10-CM

## 2025-08-05 DIAGNOSIS — Z00.00 MEDICARE ANNUAL WELLNESS VISIT, SUBSEQUENT: Primary | ICD-10-CM

## 2025-08-05 DIAGNOSIS — J43.9 HISTORY OF EMPHYSEMA (HCC): ICD-10-CM

## 2025-08-05 DIAGNOSIS — R63.6 UNDERWEIGHT: ICD-10-CM

## 2025-08-05 PROBLEM — R97.20 ELEVATED PSA: Status: RESOLVED | Noted: 2023-04-21 | Resolved: 2025-08-05

## 2025-08-05 PROCEDURE — 1159F MED LIST DOCD IN RCRD: CPT | Performed by: NURSE PRACTITIONER

## 2025-08-05 PROCEDURE — 1123F ACP DISCUSS/DSCN MKR DOCD: CPT | Performed by: NURSE PRACTITIONER

## 2025-08-05 PROCEDURE — 1160F RVW MEDS BY RX/DR IN RCRD: CPT | Performed by: NURSE PRACTITIONER

## 2025-08-05 PROCEDURE — G0439 PPPS, SUBSEQ VISIT: HCPCS | Performed by: NURSE PRACTITIONER

## 2025-08-05 RX ORDER — LORATADINE 10 MG/1
10 TABLET ORAL DAILY
Qty: 90 TABLET | Refills: 3 | Status: SHIPPED | OUTPATIENT
Start: 2025-08-05

## 2025-08-05 SDOH — ECONOMIC STABILITY: FOOD INSECURITY: WITHIN THE PAST 12 MONTHS, THE FOOD YOU BOUGHT JUST DIDN'T LAST AND YOU DIDN'T HAVE MONEY TO GET MORE.: NEVER TRUE

## 2025-08-05 SDOH — ECONOMIC STABILITY: FOOD INSECURITY: WITHIN THE PAST 12 MONTHS, YOU WORRIED THAT YOUR FOOD WOULD RUN OUT BEFORE YOU GOT MONEY TO BUY MORE.: NEVER TRUE

## 2025-08-05 ASSESSMENT — PATIENT HEALTH QUESTIONNAIRE - PHQ9
2. FEELING DOWN, DEPRESSED OR HOPELESS: NOT AT ALL
SUM OF ALL RESPONSES TO PHQ QUESTIONS 1-9: 0
1. LITTLE INTEREST OR PLEASURE IN DOING THINGS: NOT AT ALL

## 2025-08-05 ASSESSMENT — LIFESTYLE VARIABLES
HOW MANY STANDARD DRINKS CONTAINING ALCOHOL DO YOU HAVE ON A TYPICAL DAY: PATIENT DOES NOT DRINK
HOW OFTEN DO YOU HAVE A DRINK CONTAINING ALCOHOL: NEVER

## 2025-08-06 ENCOUNTER — CLINICAL DOCUMENTATION (OUTPATIENT)
Age: 80
End: 2025-08-06

## (undated) DEVICE — APPLICATOR MEDICATED 26 CC SOLUTION HI LT ORNG CHLORAPREP

## (undated) DEVICE — ELECTRODE PT RET AD L9FT HI MOIST COND ADH HYDRGEL CORDED

## (undated) DEVICE — SUTURE VCRL + SZ 3-0 L36IN ABSRB UD L36MM CT-1 1/2 CIR VCP944H

## (undated) DEVICE — PRECISION (7.0 X 0.51 X 18.5MM)

## (undated) DEVICE — SHEET, ORTHO, SPLIT, STERILE: Brand: MEDLINE

## (undated) DEVICE — PAD,ABDOMINAL,5"X9",ST,LF,25/BX: Brand: MEDLINE INDUSTRIES, INC.

## (undated) DEVICE — SOLUTION IRRIG 1000ML 0.9% SOD CHL USP POUR PLAS BTL

## (undated) DEVICE — SUTURE FIBERWIRE SZ 2 W/ TAPERED NEEDLE BLUE L38IN NONABSORB BLU L26.5MM 1/2 CIRCLE AR7200

## (undated) DEVICE — 3M™ STERI-DRAPE™ INSTRUMENT POUCH 1018L: Brand: STERI-DRAPE™

## (undated) DEVICE — FIRSTPASS ST SUTURE PASSER, SELF CAPTURE: Brand: FIRSTPASS

## (undated) DEVICE — BLANKET WRM W40.2XL55.9IN IORT LO BODY + MISTRAL AIR

## (undated) DEVICE — POUCH FLD 36X29IN SHLDR HVY LO GLARE MATTE FINISH FLM 2 SUCT

## (undated) DEVICE — TUBING PMP L16FT MAIN DISP FOR AR-6400 AR-6475

## (undated) DEVICE — DRAPE,U/ SHT,SPLIT,PLAS,STERIL: Brand: MEDLINE

## (undated) DEVICE — DRESSING TRNSPAR W8XL12IN FLM SURESITE 123

## (undated) DEVICE — BUR SHV L13CM DIA5MM 8 FLUT OVL FLUSHCUT AGG COOLCUT

## (undated) DEVICE — PROBE ABLAT XL 90DEG ASPIR BPLR RF 1 PC ELECTRD ERGO HNDL

## (undated) DEVICE — IMMOBILIZER SHLDR L10.5-17IN D7IN SLNG W/ 15DEG ABD PLLW

## (undated) DEVICE — CANNULA THREADED DISP 8.5 X 72MM: Brand: CLEAR-TRAC

## (undated) DEVICE — KIT DISP W/ SPEAR TRCR TIP OBT AND 2.6MM DRL FOR 2.6

## (undated) DEVICE — SOLUTION IRRIG 3000ML 0.9% SOD CHL USP UROMATIC PLAS CONT

## (undated) DEVICE — ASPIRATOR FLR L72IN SUCT TB W/ 1 DETACH FISH STK PUSH HNDL

## (undated) DEVICE — PROTECTOR ULN NRV PUR FOAM HK LOOP STRP ANATOMICALLY

## (undated) DEVICE — SUTURE SZ 0 27IN 5/8 CIR UR-6  TAPER PT VIOLET ABSRB VICRYL J603H

## (undated) DEVICE — SUTURE MCRYL SZ 3-0 L27IN ABSRB UD PS-2 3/8 CIR REV CUT NDL MCP427H

## (undated) DEVICE — GLOVE ORANGE PI 8   MSG9080

## (undated) DEVICE — BANDAGE COMPR W6INXL5YD SELF ADH COHESIVE CO FLX

## (undated) DEVICE — CANN 5.5 WO/HOLES LTX FREE ORANGE

## (undated) DEVICE — SUTURE PROL SZ 3-0 L18IN NONABSORBABLE BLU L24MM FS-1 3/8 8684G

## (undated) DEVICE — PENCIL ES L3M BTTN SWCH HOLSTER W/ BLDE ELECTRD EDGE

## (undated) DEVICE — CLOTH SURG PREP PREOPERATIVE CHLORHEXIDINE GLUC 2% READYPREP

## (undated) DEVICE — DRESSING TRNSPAR W5XL4.5IN FLM SHT SEMIPERMEABLE WIND

## (undated) DEVICE — TUBING, SUCTION, 9/32" X 20', STRAIGHT: Brand: MEDLINE INDUSTRIES, INC.

## (undated) DEVICE — NEEDLE SUT PASS FOR ROT CUF LABRAL REP SUREFIRE SCORPION

## (undated) DEVICE — MHPB ARTHROSCOPY PACK: Brand: MEDLINE INDUSTRIES, INC.

## (undated) DEVICE — KIT POS ULT SHLDR PT CARE REHAB SLNG

## (undated) DEVICE — STRAP,POSITIONING,KNEE/BODY,FOAM,4X60": Brand: MEDLINE

## (undated) DEVICE — DRAPE,REIN 53X77,STERILE: Brand: MEDLINE

## (undated) DEVICE — BLADE SHV L13CM DIA4MM EXCALIBUR AGG COOLCUT

## (undated) DEVICE — KIT CHAIR TRIMANO FOAM W/ SUPP ARM DRP ERGONOMICALLY DESIGNED